# Patient Record
Sex: MALE | Race: WHITE | NOT HISPANIC OR LATINO
[De-identification: names, ages, dates, MRNs, and addresses within clinical notes are randomized per-mention and may not be internally consistent; named-entity substitution may affect disease eponyms.]

---

## 2019-03-03 DIAGNOSIS — H61.23 IMPACTED CERUMEN, BILATERAL: ICD-10-CM

## 2019-03-03 PROBLEM — Z00.00 ENCOUNTER FOR PREVENTIVE HEALTH EXAMINATION: Status: ACTIVE | Noted: 2019-03-03

## 2019-03-03 RX ORDER — ASPIRIN ENTERIC COATED TABLETS 81 MG 81 MG/1
81 TABLET, DELAYED RELEASE ORAL
Refills: 0 | Status: ACTIVE | COMMUNITY

## 2019-06-02 ENCOUNTER — APPOINTMENT (OUTPATIENT)
Dept: INTERNAL MEDICINE | Facility: CLINIC | Age: 79
End: 2019-06-02
Payer: MEDICARE

## 2019-06-02 ENCOUNTER — NON-APPOINTMENT (OUTPATIENT)
Age: 79
End: 2019-06-02

## 2019-06-02 VITALS
HEIGHT: 69 IN | RESPIRATION RATE: 16 BRPM | SYSTOLIC BLOOD PRESSURE: 130 MMHG | BODY MASS INDEX: 30.51 KG/M2 | DIASTOLIC BLOOD PRESSURE: 70 MMHG | WEIGHT: 206 LBS | HEART RATE: 65 BPM

## 2019-06-02 VITALS — SYSTOLIC BLOOD PRESSURE: 132 MMHG | DIASTOLIC BLOOD PRESSURE: 70 MMHG

## 2019-06-02 DIAGNOSIS — Z23 ENCOUNTER FOR IMMUNIZATION: ICD-10-CM

## 2019-06-02 DIAGNOSIS — Z78.9 OTHER SPECIFIED HEALTH STATUS: ICD-10-CM

## 2019-06-02 DIAGNOSIS — Z82.49 FAMILY HISTORY OF ISCHEMIC HEART DISEASE AND OTHER DISEASES OF THE CIRCULATORY SYSTEM: ICD-10-CM

## 2019-06-02 DIAGNOSIS — Z82.3 FAMILY HISTORY OF STROKE: ICD-10-CM

## 2019-06-02 DIAGNOSIS — Z83.3 FAMILY HISTORY OF DIABETES MELLITUS: ICD-10-CM

## 2019-06-02 PROCEDURE — 93000 ELECTROCARDIOGRAM COMPLETE: CPT

## 2019-06-02 PROCEDURE — 90670 PCV13 VACCINE IM: CPT

## 2019-06-02 PROCEDURE — 99204 OFFICE O/P NEW MOD 45 MIN: CPT | Mod: 25

## 2019-06-02 PROCEDURE — G0009: CPT

## 2019-06-02 RX ORDER — METOPROLOL SUCCINATE 25 MG/1
25 TABLET, EXTENDED RELEASE ORAL
Refills: 0 | Status: DISCONTINUED | COMMUNITY
End: 2019-06-02

## 2019-06-02 RX ORDER — CHLORHEXIDINE GLUCONATE 4 %
1000 LIQUID (ML) TOPICAL
Qty: 30 | Refills: 5 | Status: ACTIVE | COMMUNITY
Start: 2019-06-02

## 2019-06-02 NOTE — COUNSELING
[Weight management counseling provided] : Weight management [Activity counseling provided] : activity [Low Fat Diet] : Low fat diet [Healthy eating counseling provided] : healthy eating [Decrease Portions] : Decrease food portions [Walking] : Walking [Low Salt Diet] : Low salt diet [Limited decision making ability] : Limited decision making ability [None] : None

## 2019-06-02 NOTE — HEALTH RISK ASSESSMENT
[(PHQ-2) Unable to screen] : PHQ-2: unable to screen [Patient declined colonoscopy] : Patient declined colonoscopy [Retired] : retired [With Significant Other] : lives with significant other [None] : None [] :  [Independent] : walking [Some assistance needed] : dressing [Full assistance needed] : managing finances [Smoke Detector] : smoke detector [Carbon Monoxide Detector] : carbon monoxide detector [Seat Belt] :  uses seat belt [Sunscreen] : uses sunscreen [] : No [FreeTextEntry1] : reported mood swings.  has set routine - gets set off by variations in the routine [UnableToScreenReason] : dementia [Reports changes in hearing] : Reports no changes in hearing [Reports changes in dental health] : Reports no changes in dental health [Reports changes in vision] : Reports no changes in vision [de-identified] : dementia as noted [de-identified] : dementia

## 2019-06-02 NOTE — PHYSICAL EXAM
[Well Nourished] : well nourished [No Acute Distress] : no acute distress [Well-Appearing] : well-appearing [Normal Sclera/Conjunctiva] : normal sclera/conjunctiva [Well Developed] : well developed [PERRL] : pupils equal round and reactive to light [Normal Oropharynx] : the oropharynx was normal [Normal Outer Ear/Nose] : the outer ears and nose were normal in appearance [EOMI] : extraocular movements intact [Normal Nasal Mucosa] : the nasal mucosa was normal [Normal TMs] : both tympanic membranes were normal [No JVD] : no jugular venous distention [Supple] : supple [No Lymphadenopathy] : no lymphadenopathy [Thyroid Normal, No Nodules] : the thyroid was normal and there were no nodules present [No Respiratory Distress] : no respiratory distress  [Clear to Auscultation] : lungs were clear to auscultation bilaterally [Normal Rate] : normal rate  [No Accessory Muscle Use] : no accessory muscle use [Regular Rhythm] : with a regular rhythm [Normal S1, S2] : normal S1 and S2 [No Carotid Bruits] : no carotid bruits [No Murmur] : no murmur heard [No Abdominal Bruit] : a ~M bruit was not heard ~T in the abdomen [No Edema] : there was no peripheral edema [Pedal Pulses Present] : the pedal pulses are present [Soft] : abdomen soft [No Palpable Aorta] : no palpable aorta [Non-distended] : non-distended [Non Tender] : non-tender [No Masses] : no abdominal mass palpated [No HSM] : no HSM [Normal Bowel Sounds] : normal bowel sounds [Normal Posterior Cervical Nodes] : no posterior cervical lymphadenopathy [No CVA Tenderness] : no CVA  tenderness [Normal Anterior Cervical Nodes] : no anterior cervical lymphadenopathy [No Spinal Tenderness] : no spinal tenderness [No Joint Swelling] : no joint swelling [Grossly Normal Strength/Tone] : grossly normal strength/tone [No Rash] : no rash [No Focal Deficits] : no focal deficits [Coordination Grossly Intact] : coordination grossly intact [Normal Gait] : normal gait [Speech Grossly Normal] : speech grossly normal [de-identified] : interacting, following commands, some confusion.

## 2019-06-02 NOTE — REVIEW OF SYSTEMS
[Confusion] : confusion [Memory Loss] : memory loss [Negative] : Heme/Lymph [Dizziness] : no dizziness [Headache] : no headache [Fainting] : no fainting [Unsteady Walk] : no ataxia

## 2019-06-02 NOTE — HISTORY OF PRESENT ILLNESS
[Spouse] : spouse [FreeTextEntry1] : HTN, HLD, predm, dementia, bph, gait abnormality, b12 def, melanoma s/p excisiions, hx tia [de-identified] : 77 y/o male with a hx of HTN, HLD, predm, dementia, bph, gait abnormality, b12 def, melanoma s/p excisiions, hx tia here to establish care.\par wife cares for him.\par Had labs sent 2 weeks ago.\par \par Has been doing well.  takes meds.  walks and swims for exercise.  diet - healthy.  former smoker, no etoh.\par no c/o.\par \par vaccines - tet '13\par had zostavax\par gets flu vaccine  -? never pneumonia vaccine\par \par derm - follows regularly.\par colon ca screening - defers due to the pt's med issues\par \par \par \par

## 2019-11-03 ENCOUNTER — APPOINTMENT (OUTPATIENT)
Dept: INTERNAL MEDICINE | Facility: CLINIC | Age: 79
End: 2019-11-03
Payer: MEDICARE

## 2019-11-03 VITALS
HEART RATE: 62 BPM | HEIGHT: 69 IN | WEIGHT: 210 LBS | DIASTOLIC BLOOD PRESSURE: 70 MMHG | BODY MASS INDEX: 31.1 KG/M2 | RESPIRATION RATE: 16 BRPM | SYSTOLIC BLOOD PRESSURE: 130 MMHG

## 2019-11-03 PROCEDURE — 99214 OFFICE O/P EST MOD 30 MIN: CPT | Mod: 25

## 2019-11-03 PROCEDURE — G0008: CPT

## 2019-11-03 PROCEDURE — 90662 IIV NO PRSV INCREASED AG IM: CPT

## 2019-11-03 PROCEDURE — 36415 COLL VENOUS BLD VENIPUNCTURE: CPT

## 2019-11-03 NOTE — HISTORY OF PRESENT ILLNESS
[FreeTextEntry1] : HTN, HLD, predm, dementia, bph, gait abnormality, b12 def, melanoma s/p excisiions, hx tia [de-identified] : 79 y/o male with a hx of HTN, HLD, predm, dementia, bph, gait abnormality, b12 def, melanoma s/p excisiions, hx tia here to establish care.\par wife cares for him.\par Has been doing well. takes meds. walks and swims for exercise. Swimming on hiatus for the season\par diet - healthy. former smoker, no etoh.\par no c/o.\par Has been doing well.  \par Dementia stable.  \par \par vaccines - tet '13\par had zostavax\par gets flu vaccine -\par \par derm - follows regularly.\par colon ca screening - defers due to the pt's med issues

## 2019-11-03 NOTE — PHYSICAL EXAM
[No Acute Distress] : no acute distress [Well Nourished] : well nourished [Well Developed] : well developed [Well-Appearing] : well-appearing [Normal Sclera/Conjunctiva] : normal sclera/conjunctiva [PERRL] : pupils equal round and reactive to light [EOMI] : extraocular movements intact [Normal Outer Ear/Nose] : the outer ears and nose were normal in appearance [Normal Oropharynx] : the oropharynx was normal [No JVD] : no jugular venous distention [No Lymphadenopathy] : no lymphadenopathy [Supple] : supple [Thyroid Normal, No Nodules] : the thyroid was normal and there were no nodules present [No Respiratory Distress] : no respiratory distress  [No Accessory Muscle Use] : no accessory muscle use [Clear to Auscultation] : lungs were clear to auscultation bilaterally [Normal Rate] : normal rate  [Regular Rhythm] : with a regular rhythm [Normal S1, S2] : normal S1 and S2 [No Murmur] : no murmur heard [No Carotid Bruits] : no carotid bruits [No Abdominal Bruit] : a ~M bruit was not heard ~T in the abdomen [Pedal Pulses Present] : the pedal pulses are present [No Edema] : there was no peripheral edema [Soft] : abdomen soft [Non Tender] : non-tender [Non-distended] : non-distended [No Masses] : no abdominal mass palpated [No HSM] : no HSM [Normal Bowel Sounds] : normal bowel sounds [Normal Posterior Cervical Nodes] : no posterior cervical lymphadenopathy [Normal Anterior Cervical Nodes] : no anterior cervical lymphadenopathy [No CVA Tenderness] : no CVA  tenderness [No Spinal Tenderness] : no spinal tenderness [No Joint Swelling] : no joint swelling [Grossly Normal Strength/Tone] : grossly normal strength/tone [No Rash] : no rash [Coordination Grossly Intact] : coordination grossly intact [No Focal Deficits] : no focal deficits [Normal Gait] : normal gait [Speech Grossly Normal] : speech grossly normal [de-identified] : dec hearing [de-identified] : answering questions, following commands, calm, pleasant.

## 2019-11-03 NOTE — REVIEW OF SYSTEMS
[Confusion] : confusion [Memory Loss] : memory loss [Negative] : Heme/Lymph [Headache] : no headache [Dizziness] : no dizziness [Fainting] : no fainting [Unsteady Walk] : no ataxia

## 2019-11-03 NOTE — HEALTH RISK ASSESSMENT
[No] : In the past 12 months have you used drugs other than those required for medical reasons? No [(PHQ-2) Unable to screen] : PHQ-2: unable to screen [] : No [Audit-CScore] : 0 [UnableToScreenReason] : dementia

## 2019-11-03 NOTE — COUNSELING
[Encouraged to increase physical activity] : Encouraged to increase physical activity [Decrease Portions] : decrease portions

## 2019-11-04 LAB
ALBUMIN SERPL ELPH-MCNC: 4.8 G/DL
ALP BLD-CCNC: 82 U/L
ALT SERPL-CCNC: 37 U/L
ANION GAP SERPL CALC-SCNC: 18 MMOL/L
AST SERPL-CCNC: 27 U/L
BASOPHILS # BLD AUTO: 0.02 K/UL
BASOPHILS NFR BLD AUTO: 0.4 %
BILIRUB SERPL-MCNC: 0.8 MG/DL
BUN SERPL-MCNC: 12 MG/DL
CALCIUM SERPL-MCNC: 9.7 MG/DL
CHLORIDE SERPL-SCNC: 106 MMOL/L
CHOLEST SERPL-MCNC: 148 MG/DL
CHOLEST/HDLC SERPL: 3.4 RATIO
CO2 SERPL-SCNC: 23 MMOL/L
CREAT SERPL-MCNC: 1.08 MG/DL
EOSINOPHIL # BLD AUTO: 0.19 K/UL
EOSINOPHIL NFR BLD AUTO: 3.4 %
ESTIMATED AVERAGE GLUCOSE: 131 MG/DL
FOLATE SERPL-MCNC: >20 NG/ML
GLUCOSE SERPL-MCNC: 124 MG/DL
HBA1C MFR BLD HPLC: 6.2 %
HCT VFR BLD CALC: 47.8 %
HDLC SERPL-MCNC: 43 MG/DL
HGB BLD-MCNC: 15.3 G/DL
IMM GRANULOCYTES NFR BLD AUTO: 0.7 %
LDLC SERPL CALC-MCNC: 89 MG/DL
LYMPHOCYTES # BLD AUTO: 2.24 K/UL
LYMPHOCYTES NFR BLD AUTO: 40.1 %
MAN DIFF?: NORMAL
MCHC RBC-ENTMCNC: 29.9 PG
MCHC RBC-ENTMCNC: 32 GM/DL
MCV RBC AUTO: 93.5 FL
MONOCYTES # BLD AUTO: 0.71 K/UL
MONOCYTES NFR BLD AUTO: 12.7 %
NEUTROPHILS # BLD AUTO: 2.39 K/UL
NEUTROPHILS NFR BLD AUTO: 42.7 %
PLATELET # BLD AUTO: 246 K/UL
POTASSIUM SERPL-SCNC: 4.9 MMOL/L
PROT SERPL-MCNC: 7.4 G/DL
RBC # BLD: 5.11 M/UL
RBC # FLD: 12.4 %
SODIUM SERPL-SCNC: 147 MMOL/L
TRIGL SERPL-MCNC: 81 MG/DL
TSH SERPL-ACNC: 1.15 UIU/ML
VIT B12 SERPL-MCNC: 763 PG/ML
WBC # FLD AUTO: 5.59 K/UL

## 2020-04-19 ENCOUNTER — APPOINTMENT (OUTPATIENT)
Dept: INTERNAL MEDICINE | Facility: CLINIC | Age: 80
End: 2020-04-19

## 2020-06-16 ENCOUNTER — APPOINTMENT (OUTPATIENT)
Dept: INTERNAL MEDICINE | Facility: CLINIC | Age: 80
End: 2020-06-16
Payer: MEDICARE

## 2020-06-16 PROCEDURE — 99214 OFFICE O/P EST MOD 30 MIN: CPT | Mod: 95

## 2020-06-16 NOTE — PHYSICAL EXAM
[No Acute Distress] : no acute distress [Well Nourished] : well nourished [Well Developed] : well developed [Well-Appearing] : well-appearing [Normal Voice/Communication] : normal voice/communication [No Respiratory Distress] : no respiratory distress  [Coordination Grossly Intact] : coordination grossly intact [Normal Mood] : the mood was normal [Speech Grossly Normal] : speech grossly normal [de-identified] : confusion, interacting w/o issues. [de-identified] : dementia

## 2020-06-16 NOTE — HISTORY OF PRESENT ILLNESS
[Spouse] : spouse [Home] : at home, [unfilled] , at the time of the visit. [Medical Office: ([facility])___] : at the medical office located in  [FreeTextEntry1] : HTN, HLD, predm, dementia, bph, gait abnormality, b12 def, melanoma s/p excisiions, hx tia [FreeTextEntry3] : Wife [de-identified] : 79 y/o male with a hx of HTN, HLD, predm, dementia, bph, gait abnormality, b12 def, melanoma s/p excisiions, hx tia for f/u.\par wife cares for him.\par Has been doing well. takes meds. walks and swims for exercise. Swimming on hiatus for the season and b/c covid 19\par diet - healthy. former smoker, no etoh.\par no c/o.\par With pain/arthritis at the hand.  Occ pain at the back of the legs with walking.  \par Dementia has been worsened - some wandering and intermittent inc confusion.  Has had 1-2 incidences of wandering - wife asking for something to help calm the pt.  \par \par vaccines - tet '13\par had zostavax\par gets flu vaccine -\par \par derm - follows regularly.\par colon ca screening - defers due to the pt's med issues

## 2020-06-16 NOTE — REVIEW OF SYSTEMS
[Joint Pain] : joint pain [Confusion] : confusion [Memory Loss] : memory loss [Negative] : Integumentary [Joint Stiffness] : no joint stiffness [Muscle Pain] : no muscle pain [Back Pain] : no back pain [Joint Swelling] : no joint swelling [Muscle Weakness] : no muscle weakness [Headache] : no headache [Dizziness] : no dizziness [Fainting] : no fainting [Unsteady Walk] : no ataxia

## 2020-06-16 NOTE — HEALTH RISK ASSESSMENT
[No] : No [(PHQ-2) Unable to screen] : PHQ-2: unable to screen [] : No [Audit-CScore] : 0 [UnableToScreenReason] : marc

## 2020-06-28 LAB
ALBUMIN SERPL ELPH-MCNC: 4.5 G/DL
ALP BLD-CCNC: 72 U/L
ALT SERPL-CCNC: 34 U/L
ANION GAP SERPL CALC-SCNC: 23 MMOL/L
AST SERPL-CCNC: 23 U/L
BASOPHILS # BLD AUTO: 0.03 K/UL
BASOPHILS NFR BLD AUTO: 0.5 %
BILIRUB SERPL-MCNC: 0.7 MG/DL
BUN SERPL-MCNC: 15 MG/DL
CALCIUM SERPL-MCNC: 9.1 MG/DL
CHLORIDE SERPL-SCNC: 96 MMOL/L
CHOLEST SERPL-MCNC: 133 MG/DL
CHOLEST/HDLC SERPL: 3.6 RATIO
CO2 SERPL-SCNC: 22 MMOL/L
CREAT SERPL-MCNC: 1.06 MG/DL
EOSINOPHIL # BLD AUTO: 0.09 K/UL
EOSINOPHIL NFR BLD AUTO: 1.6 %
ESTIMATED AVERAGE GLUCOSE: 140 MG/DL
FOLATE SERPL-MCNC: >20 NG/ML
GLUCOSE SERPL-MCNC: 139 MG/DL
HBA1C MFR BLD HPLC: 6.5 %
HCT VFR BLD CALC: 47.1 %
HDLC SERPL-MCNC: 37 MG/DL
HGB BLD-MCNC: 14.7 G/DL
IMM GRANULOCYTES NFR BLD AUTO: 0.7 %
LDLC SERPL CALC-MCNC: 69 MG/DL
LYMPHOCYTES # BLD AUTO: 2.08 K/UL
LYMPHOCYTES NFR BLD AUTO: 36.9 %
MAN DIFF?: NORMAL
MCHC RBC-ENTMCNC: 29.6 PG
MCHC RBC-ENTMCNC: 31.2 GM/DL
MCV RBC AUTO: 95 FL
MONOCYTES # BLD AUTO: 0.62 K/UL
MONOCYTES NFR BLD AUTO: 11 %
NEUTROPHILS # BLD AUTO: 2.78 K/UL
NEUTROPHILS NFR BLD AUTO: 49.3 %
PLATELET # BLD AUTO: 212 K/UL
POTASSIUM SERPL-SCNC: 3.5 MMOL/L
PROT SERPL-MCNC: 6.8 G/DL
RBC # BLD: 4.96 M/UL
RBC # FLD: 12.7 %
SODIUM SERPL-SCNC: 141 MMOL/L
TRIGL SERPL-MCNC: 138 MG/DL
VIT B12 SERPL-MCNC: 727 PG/ML
WBC # FLD AUTO: 5.64 K/UL

## 2020-09-06 ENCOUNTER — APPOINTMENT (OUTPATIENT)
Dept: INTERNAL MEDICINE | Facility: CLINIC | Age: 80
End: 2020-09-06
Payer: MEDICARE

## 2020-09-06 VITALS
HEIGHT: 69 IN | BODY MASS INDEX: 32.14 KG/M2 | HEART RATE: 65 BPM | SYSTOLIC BLOOD PRESSURE: 130 MMHG | RESPIRATION RATE: 16 BRPM | DIASTOLIC BLOOD PRESSURE: 78 MMHG | WEIGHT: 217 LBS

## 2020-09-06 PROCEDURE — G0008: CPT

## 2020-09-06 PROCEDURE — 90662 IIV NO PRSV INCREASED AG IM: CPT

## 2020-09-06 PROCEDURE — 99214 OFFICE O/P EST MOD 30 MIN: CPT | Mod: 25

## 2020-09-06 NOTE — REVIEW OF SYSTEMS
[Joint Pain] : joint pain [Confusion] : confusion [Memory Loss] : memory loss [Negative] : Genitourinary [Muscle Weakness] : no muscle weakness [Muscle Pain] : no muscle pain [Joint Stiffness] : no joint stiffness [Back Pain] : no back pain [Headache] : no headache [Joint Swelling] : no joint swelling [Fainting] : no fainting [Unsteady Walk] : no ataxia [Dizziness] : no dizziness

## 2020-09-06 NOTE — HEALTH RISK ASSESSMENT
[No] : In the past 12 months have you used drugs other than those required for medical reasons? No [(PHQ-2) Unable to screen] : PHQ-2: unable to screen [] : No [UnableToScreenReason] : dementia [Audit-CScore] : 0

## 2020-09-06 NOTE — PHYSICAL EXAM
[No Acute Distress] : no acute distress [Well-Appearing] : well-appearing [Well Nourished] : well nourished [Well Developed] : well developed [Coordination Grossly Intact] : coordination grossly intact [Normal Voice/Communication] : normal voice/communication [No Respiratory Distress] : no respiratory distress  [Speech Grossly Normal] : speech grossly normal [Normal Mood] : the mood was normal [de-identified] : dementia [de-identified] : confusion, interacting w/o issues.

## 2020-09-06 NOTE — COUNSELING
[Decrease Portions] : decrease portions [Encouraged to increase physical activity] : Encouraged to increase physical activity [Limited decision making ability] : Limited decision making ability [Needs reinforcement, provided] : Patient needs reinforcement on understanding of lifestyle changes and steps needed to achieve self management goal; reinforcement was provided

## 2020-09-06 NOTE — HISTORY OF PRESENT ILLNESS
[Spouse] : spouse [FreeTextEntry1] : HTN, HLD, predm, dementia, bph, gait abnormality, b12 def, melanoma s/p excisiions, hx tia [de-identified] : 80 y/o male with a hx of HTN, HLD, predm, dementia, bph, gait abnormality, b12 def, melanoma s/p excisiions, hx tia for f/u.\par wife cares for him.\par Has been doing well. takes meds. walks and swims for exercise. Swimming on hiatus for the season and b/c covid 19\par diet - healthy. former smoker, no etoh.\par no c/o.\par With pain/arthritis at the hand - has been getting worse - with lt 4th finger trigger finger.  Occ pain at the back of the legs with walking.  \par Dementia - has been getting good relief of the anx with the citalopram.  wears off a bit in the evening and occ has night time sx.  Asking about inc the dose.\par The tamsulosin has been less effective.  with inc in the nocturia.   \par \par vaccines - tet '13\par had zostavax\par gets flu vaccine -\par \par derm - follows regularly.\par colon ca screening - defers due to the pt's med issues

## 2020-11-16 ENCOUNTER — RX RENEWAL (OUTPATIENT)
Age: 80
End: 2020-11-16

## 2020-11-23 ENCOUNTER — RX RENEWAL (OUTPATIENT)
Age: 80
End: 2020-11-23

## 2020-11-29 ENCOUNTER — APPOINTMENT (OUTPATIENT)
Dept: INTERNAL MEDICINE | Facility: CLINIC | Age: 80
End: 2020-11-29
Payer: MEDICARE

## 2020-11-29 VITALS
WEIGHT: 218 LBS | HEIGHT: 69 IN | HEART RATE: 67 BPM | RESPIRATION RATE: 16 BRPM | SYSTOLIC BLOOD PRESSURE: 128 MMHG | BODY MASS INDEX: 32.29 KG/M2 | DIASTOLIC BLOOD PRESSURE: 80 MMHG

## 2020-11-29 PROCEDURE — 99214 OFFICE O/P EST MOD 30 MIN: CPT | Mod: 25

## 2020-11-29 PROCEDURE — 36415 COLL VENOUS BLD VENIPUNCTURE: CPT

## 2020-11-29 RX ORDER — CITALOPRAM HYDROBROMIDE 10 MG/1
10 TABLET, FILM COATED ORAL
Qty: 30 | Refills: 0 | Status: COMPLETED | COMMUNITY
Start: 2020-06-16

## 2020-11-29 RX ORDER — CLOBETASOL PROPIONATE 0.5 MG/G
0.05 CREAM TOPICAL
Qty: 60 | Refills: 0 | Status: COMPLETED | COMMUNITY
Start: 2020-07-02

## 2020-11-29 NOTE — COUNSELING
[Encouraged to increase physical activity] : Encouraged to increase physical activity [Limited decision making ability] : Limited decision making ability [Needs reinforcement, provided] : Patient needs reinforcement on understanding of lifestyle changes and steps needed to achieve self management goal; reinforcement was provided

## 2020-11-29 NOTE — HISTORY OF PRESENT ILLNESS
[Spouse] : spouse [FreeTextEntry1] : HTN, HLD, predm, dementia, bph, gait abnormality, b12 def, melanoma s/p excisiions, hx tia [de-identified] : 80 y/o male with a hx of HTN, HLD, predm, dementia, bph, gait abnormality, b12 def, melanoma s/p excisiions, hx tia for f/u.\par wife cares for him.\par Has been doing well. takes meds. walks and swims for exercise. Swimming on hiatus for the season and b/c covid 19 - has been sl worse.\par diet - healthy. former smoker, no etoh.\par no c/o.\par With pain/arthritis at the hand - has been getting worse - with lt 4th finger trigger finger.  Occ pain at the back of the legs with walking - has nor been able to go for eval.  Stable.  \par Dementia - has been getting good relief of the anx with the citalopram.  wears off a bit in the evening and occ has night time sx.  Has been declining a bit.  \par The tamsulosin has been less effective.  with inc in the nocturia - no change.   \par \par vaccines - tet '13\par had zostavax\par gets flu vaccine -\par \par derm - follows regularly.\par colon ca screening - defers due to the pt's med issues

## 2020-11-29 NOTE — REVIEW OF SYSTEMS
[Joint Pain] : joint pain [Confusion] : confusion [Memory Loss] : memory loss [Negative] : Heme/Lymph [Joint Stiffness] : no joint stiffness [Muscle Pain] : no muscle pain [Muscle Weakness] : no muscle weakness [Back Pain] : no back pain [Joint Swelling] : no joint swelling [Headache] : no headache [Dizziness] : no dizziness [Fainting] : no fainting [Unsteady Walk] : no ataxia

## 2020-11-29 NOTE — PHYSICAL EXAM
[No Acute Distress] : no acute distress [Well Nourished] : well nourished [Well Developed] : well developed [Well-Appearing] : well-appearing [Normal Voice/Communication] : normal voice/communication [No Respiratory Distress] : no respiratory distress  [Coordination Grossly Intact] : coordination grossly intact [Speech Grossly Normal] : speech grossly normal [Normal Sclera/Conjunctiva] : normal sclera/conjunctiva [PERRL] : pupils equal round and reactive to light [EOMI] : extraocular movements intact [Normal Outer Ear/Nose] : the outer ears and nose were normal in appearance [No JVD] : no jugular venous distention [No Lymphadenopathy] : no lymphadenopathy [Supple] : supple [No Accessory Muscle Use] : no accessory muscle use [Clear to Auscultation] : lungs were clear to auscultation bilaterally [Normal Rate] : normal rate  [Regular Rhythm] : with a regular rhythm [Normal S1, S2] : normal S1 and S2 [No Murmur] : no murmur heard [No Carotid Bruits] : no carotid bruits [No Abdominal Bruit] : a ~M bruit was not heard ~T in the abdomen [Pedal Pulses Present] : the pedal pulses are present [No Edema] : there was no peripheral edema [Soft] : abdomen soft [Non Tender] : non-tender [Non-distended] : non-distended [No Masses] : no abdominal mass palpated [No HSM] : no HSM [Normal Bowel Sounds] : normal bowel sounds [Normal Posterior Cervical Nodes] : no posterior cervical lymphadenopathy [Normal Anterior Cervical Nodes] : no anterior cervical lymphadenopathy [No CVA Tenderness] : no CVA  tenderness [No Spinal Tenderness] : no spinal tenderness [No Joint Swelling] : no joint swelling [Grossly Normal Strength/Tone] : grossly normal strength/tone [de-identified] : confusion, some interaction [de-identified] : dementia

## 2020-12-01 LAB
ALBUMIN SERPL ELPH-MCNC: 4.7 G/DL
ALP BLD-CCNC: 79 U/L
ALT SERPL-CCNC: 41 U/L
ANION GAP SERPL CALC-SCNC: 12 MMOL/L
AST SERPL-CCNC: 23 U/L
BASOPHILS # BLD AUTO: 0.03 K/UL
BASOPHILS NFR BLD AUTO: 0.5 %
BILIRUB SERPL-MCNC: 0.7 MG/DL
BUN SERPL-MCNC: 15 MG/DL
CALCIUM SERPL-MCNC: 9.4 MG/DL
CHLORIDE SERPL-SCNC: 103 MMOL/L
CHOLEST SERPL-MCNC: 139 MG/DL
CO2 SERPL-SCNC: 27 MMOL/L
CREAT SERPL-MCNC: 1.05 MG/DL
EOSINOPHIL # BLD AUTO: 0.16 K/UL
EOSINOPHIL NFR BLD AUTO: 2.7 %
ESTIMATED AVERAGE GLUCOSE: 143 MG/DL
FOLATE SERPL-MCNC: >20 NG/ML
GLUCOSE SERPL-MCNC: 147 MG/DL
HBA1C MFR BLD HPLC: 6.6 %
HCT VFR BLD CALC: 47.3 %
HDLC SERPL-MCNC: 40 MG/DL
HGB BLD-MCNC: 15 G/DL
IMM GRANULOCYTES NFR BLD AUTO: 0.8 %
LDLC SERPL CALC-MCNC: 75 MG/DL
LYMPHOCYTES # BLD AUTO: 2.27 K/UL
LYMPHOCYTES NFR BLD AUTO: 38.2 %
MAN DIFF?: NORMAL
MCHC RBC-ENTMCNC: 29.9 PG
MCHC RBC-ENTMCNC: 31.7 GM/DL
MCV RBC AUTO: 94.4 FL
MONOCYTES # BLD AUTO: 0.6 K/UL
MONOCYTES NFR BLD AUTO: 10.1 %
NEUTROPHILS # BLD AUTO: 2.84 K/UL
NEUTROPHILS NFR BLD AUTO: 47.7 %
NONHDLC SERPL-MCNC: 99 MG/DL
PLATELET # BLD AUTO: 211 K/UL
POTASSIUM SERPL-SCNC: 5.1 MMOL/L
PROT SERPL-MCNC: 7.1 G/DL
RBC # BLD: 5.01 M/UL
RBC # FLD: 12.5 %
SODIUM SERPL-SCNC: 142 MMOL/L
TRIGL SERPL-MCNC: 121 MG/DL
TSH SERPL-ACNC: 1.24 UIU/ML
VIT B12 SERPL-MCNC: 778 PG/ML
WBC # FLD AUTO: 5.95 K/UL

## 2021-03-07 ENCOUNTER — RX RENEWAL (OUTPATIENT)
Age: 81
End: 2021-03-07

## 2021-03-21 ENCOUNTER — APPOINTMENT (OUTPATIENT)
Dept: INTERNAL MEDICINE | Facility: CLINIC | Age: 81
End: 2021-03-21
Payer: MEDICARE

## 2021-03-21 VITALS
HEART RATE: 64 BPM | RESPIRATION RATE: 16 BRPM | HEIGHT: 69 IN | WEIGHT: 220 LBS | OXYGEN SATURATION: 97 % | BODY MASS INDEX: 32.58 KG/M2 | SYSTOLIC BLOOD PRESSURE: 120 MMHG | TEMPERATURE: 97.8 F | DIASTOLIC BLOOD PRESSURE: 80 MMHG

## 2021-03-21 PROCEDURE — 36415 COLL VENOUS BLD VENIPUNCTURE: CPT

## 2021-03-21 PROCEDURE — 99214 OFFICE O/P EST MOD 30 MIN: CPT | Mod: 25

## 2021-03-21 RX ORDER — MEMANTINE HYDROCHLORIDE 10 MG/1
10 TABLET, FILM COATED ORAL
Qty: 180 | Refills: 3 | Status: ACTIVE | COMMUNITY
Start: 2020-11-23 | End: 1900-01-01

## 2021-03-21 RX ORDER — ATORVASTATIN CALCIUM 10 MG/1
10 TABLET, FILM COATED ORAL
Qty: 90 | Refills: 3 | Status: ACTIVE | COMMUNITY
Start: 1900-01-01 | End: 1900-01-01

## 2021-03-21 RX ORDER — CITALOPRAM HYDROBROMIDE 20 MG/1
20 TABLET, FILM COATED ORAL
Qty: 90 | Refills: 3 | Status: ACTIVE | COMMUNITY
Start: 2020-06-16 | End: 1900-01-01

## 2021-03-21 RX ORDER — RIVASTIGMINE TARTRATE 4.5 MG/1
4.5 CAPSULE ORAL
Qty: 90 | Refills: 3 | Status: ACTIVE | COMMUNITY
Start: 2020-11-16 | End: 1900-01-01

## 2021-03-21 RX ORDER — METOPROLOL TARTRATE 25 MG/1
25 TABLET, FILM COATED ORAL
Qty: 90 | Refills: 3 | Status: ACTIVE | COMMUNITY
Start: 2019-06-02 | End: 1900-01-01

## 2021-03-21 RX ORDER — TAMSULOSIN HYDROCHLORIDE 0.4 MG/1
0.4 CAPSULE ORAL
Qty: 90 | Refills: 3 | Status: ACTIVE | COMMUNITY
Start: 2021-03-07 | End: 1900-01-01

## 2021-03-21 NOTE — COUNSELING
[Potential consequences of obesity discussed] : Potential consequences of obesity discussed [Benefits of weight loss discussed] : Benefits of weight loss discussed [Encouraged to increase physical activity] : Encouraged to increase physical activity [Decrease Portions] : decrease portions [Limited decision making ability] : Limited decision making ability [Needs reinforcement, provided] : Patient needs reinforcement on understanding of lifestyle changes and steps needed to achieve self management goal; reinforcement was provided [Fall prevention counseling provided] : Fall prevention counseling provided [Adequate lighting] : Adequate lighting [No throw rugs] : No throw rugs [Use proper foot wear] : Use proper foot wear [Use recommended devices] : Use recommended devices

## 2021-03-21 NOTE — HISTORY OF PRESENT ILLNESS
[Spouse] : spouse [FreeTextEntry1] : HTN, HLD, predm, dementia, bph, gait abnormality, b12 def, melanoma s/p excisiions, hx tia [de-identified] : 79 y/o male with a hx of HTN, HLD, predm, dementia, bph, gait abnormality, b12 def, melanoma s/p excisiions, hx tia for f/u.\par wife cares for him.\par Had been doing well. takes meds. walks and swims for exercise. Swimming on hiatus for the season and b/c covid 19 - has been sl worse.\par diet - healthy. former smoker, no etoh.\par no c/o.\par With pain/arthritis at the hand - has been getting worse - with lt 4th finger trigger finger.  Occ pain at the back of the legs with walking - has nor been able to go for eval.  Stable.  \par Dementia - has been getting good relief of the anx with the citalopram.  wears off a bit in the evening and occ has night time sx.  Has been declining with waking during the night and looking for someone.  does not know his wife during these episodes.  Has been having more often.  Gait has been off.  language has been off.  \par The tamsulosin has been less effective.  with inc in the nocturia - no change.   \par \par vaccines - tet '13\par had zostavax\par gets flu vaccine -\par Had covid vaccine #1\par \par derm - follows regularly.\par colon ca screening - defers due to the pt's med issues

## 2021-03-21 NOTE — PHYSICAL EXAM
[Well Nourished] : well nourished [Well Developed] : well developed [Well-Appearing] : well-appearing [Normal Sclera/Conjunctiva] : normal sclera/conjunctiva [PERRL] : pupils equal round and reactive to light [EOMI] : extraocular movements intact [Normal Outer Ear/Nose] : the outer ears and nose were normal in appearance [No JVD] : no jugular venous distention [No Lymphadenopathy] : no lymphadenopathy [Supple] : supple [No Respiratory Distress] : no respiratory distress  [No Accessory Muscle Use] : no accessory muscle use [Clear to Auscultation] : lungs were clear to auscultation bilaterally [Normal Rate] : normal rate  [Regular Rhythm] : with a regular rhythm [Normal S1, S2] : normal S1 and S2 [No Murmur] : no murmur heard [No Carotid Bruits] : no carotid bruits [No Abdominal Bruit] : a ~M bruit was not heard ~T in the abdomen [Pedal Pulses Present] : the pedal pulses are present [No Edema] : there was no peripheral edema [Soft] : abdomen soft [Non Tender] : non-tender [Non-distended] : non-distended [No Masses] : no abdominal mass palpated [No HSM] : no HSM [Normal Bowel Sounds] : normal bowel sounds [Normal Posterior Cervical Nodes] : no posterior cervical lymphadenopathy [Normal Anterior Cervical Nodes] : no anterior cervical lymphadenopathy [No CVA Tenderness] : no CVA  tenderness [No Spinal Tenderness] : no spinal tenderness [No Joint Swelling] : no joint swelling [Grossly Normal Strength/Tone] : grossly normal strength/tone [Coordination Grossly Intact] : coordination grossly intact [Speech Grossly Normal] : speech grossly normal [No Acute Distress] : no acute distress [de-identified] : confusion, some interaction [de-identified] : dementia

## 2021-03-21 NOTE — REVIEW OF SYSTEMS
[Joint Pain] : joint pain [Confusion] : confusion [Memory Loss] : memory loss [Negative] : Heme/Lymph [Unsteady Walk] : ataxia [Joint Stiffness] : no joint stiffness [Muscle Pain] : no muscle pain [Muscle Weakness] : no muscle weakness [Back Pain] : no back pain [Joint Swelling] : no joint swelling [Headache] : no headache [Dizziness] : no dizziness [Fainting] : no fainting

## 2021-03-21 NOTE — ASSESSMENT
[FreeTextEntry1] : offered home care eval or poss care coordination eval.  Declined by the pt's wife.

## 2021-03-22 LAB
ALBUMIN SERPL ELPH-MCNC: 4.4 G/DL
ALP BLD-CCNC: 84 U/L
ALT SERPL-CCNC: 33 U/L
ANION GAP SERPL CALC-SCNC: 14 MMOL/L
AST SERPL-CCNC: 21 U/L
BASOPHILS # BLD AUTO: 0.03 K/UL
BASOPHILS NFR BLD AUTO: 0.6 %
BILIRUB SERPL-MCNC: 0.5 MG/DL
BUN SERPL-MCNC: 15 MG/DL
CALCIUM SERPL-MCNC: 8.9 MG/DL
CHLORIDE SERPL-SCNC: 104 MMOL/L
CHOLEST SERPL-MCNC: 131 MG/DL
CO2 SERPL-SCNC: 22 MMOL/L
CREAT SERPL-MCNC: 1 MG/DL
EOSINOPHIL # BLD AUTO: 0.15 K/UL
EOSINOPHIL NFR BLD AUTO: 2.8 %
ESTIMATED AVERAGE GLUCOSE: 148 MG/DL
FOLATE SERPL-MCNC: >20 NG/ML
GLUCOSE SERPL-MCNC: 150 MG/DL
HBA1C MFR BLD HPLC: 6.8 %
HCT VFR BLD CALC: 45.5 %
HDLC SERPL-MCNC: 41 MG/DL
HGB BLD-MCNC: 14.9 G/DL
IMM GRANULOCYTES NFR BLD AUTO: 0.9 %
LDLC SERPL CALC-MCNC: 74 MG/DL
LYMPHOCYTES # BLD AUTO: 2.2 K/UL
LYMPHOCYTES NFR BLD AUTO: 40.4 %
MAN DIFF?: NORMAL
MCHC RBC-ENTMCNC: 30 PG
MCHC RBC-ENTMCNC: 32.7 GM/DL
MCV RBC AUTO: 91.7 FL
MONOCYTES # BLD AUTO: 0.64 K/UL
MONOCYTES NFR BLD AUTO: 11.8 %
NEUTROPHILS # BLD AUTO: 2.37 K/UL
NEUTROPHILS NFR BLD AUTO: 43.5 %
NONHDLC SERPL-MCNC: 91 MG/DL
PLATELET # BLD AUTO: 214 K/UL
POTASSIUM SERPL-SCNC: 4.5 MMOL/L
PROT SERPL-MCNC: 7 G/DL
RBC # BLD: 4.96 M/UL
RBC # FLD: 12.6 %
SODIUM SERPL-SCNC: 140 MMOL/L
TRIGL SERPL-MCNC: 85 MG/DL
TSH SERPL-ACNC: 1.34 UIU/ML
VIT B12 SERPL-MCNC: 741 PG/ML
WBC # FLD AUTO: 5.44 K/UL

## 2021-07-02 ENCOUNTER — RX RENEWAL (OUTPATIENT)
Age: 81
End: 2021-07-02

## 2021-08-31 ENCOUNTER — NON-APPOINTMENT (OUTPATIENT)
Age: 81
End: 2021-08-31

## 2021-08-31 ENCOUNTER — EMERGENCY (EMERGENCY)
Facility: HOSPITAL | Age: 81
LOS: 0 days | Discharge: HOME | End: 2021-08-31
Attending: STUDENT IN AN ORGANIZED HEALTH CARE EDUCATION/TRAINING PROGRAM
Payer: MEDICARE

## 2021-08-31 VITALS
SYSTOLIC BLOOD PRESSURE: 149 MMHG | HEART RATE: 84 BPM | TEMPERATURE: 98 F | DIASTOLIC BLOOD PRESSURE: 89 MMHG | OXYGEN SATURATION: 97 % | RESPIRATION RATE: 19 BRPM

## 2021-08-31 VITALS
SYSTOLIC BLOOD PRESSURE: 125 MMHG | HEART RATE: 63 BPM | RESPIRATION RATE: 20 BRPM | TEMPERATURE: 98 F | OXYGEN SATURATION: 100 % | HEIGHT: 69 IN | DIASTOLIC BLOOD PRESSURE: 75 MMHG | WEIGHT: 190.04 LBS

## 2021-08-31 DIAGNOSIS — F02.80 DEMENTIA IN OTHER DISEASES CLASSIFIED ELSEWHERE, UNSPECIFIED SEVERITY, WITHOUT BEHAVIORAL DISTURBANCE, PSYCHOTIC DISTURBANCE, MOOD DISTURBANCE, AND ANXIETY: ICD-10-CM

## 2021-08-31 DIAGNOSIS — S22.41XA MULTIPLE FRACTURES OF RIBS, RIGHT SIDE, INITIAL ENCOUNTER FOR CLOSED FRACTURE: ICD-10-CM
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
## 2021-08-31 DIAGNOSIS — E78.5 HYPERLIPIDEMIA, UNSPECIFIED: ICD-10-CM

## 2021-08-31 DIAGNOSIS — R10.9 UNSPECIFIED ABDOMINAL PAIN: ICD-10-CM
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
## 2021-08-31 DIAGNOSIS — G30.9 ALZHEIMER'S DISEASE, UNSPECIFIED: ICD-10-CM

## 2021-08-31 DIAGNOSIS — N40.0 BENIGN PROSTATIC HYPERPLASIA WITHOUT LOWER URINARY TRACT SYMPTOMS: ICD-10-CM
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
## 2021-08-31 LAB
ALBUMIN SERPL ELPH-MCNC: 3.3 G/DL — SIGNIFICANT CHANGE UP (ref 3.3–5)
ALP SERPL-CCNC: 79 U/L — SIGNIFICANT CHANGE UP (ref 40–120)
ALT FLD-CCNC: 39 U/L — SIGNIFICANT CHANGE UP (ref 12–78)
ANION GAP SERPL CALC-SCNC: 3 MMOL/L — LOW (ref 5–17)
APPEARANCE UR: CLEAR — SIGNIFICANT CHANGE UP
AST SERPL-CCNC: 19 U/L — SIGNIFICANT CHANGE UP (ref 15–37)
BACTERIA # UR AUTO: ABNORMAL
BASOPHILS # BLD AUTO: 0.04 K/UL — SIGNIFICANT CHANGE UP (ref 0–0.2)
BASOPHILS NFR BLD AUTO: 0.7 % — SIGNIFICANT CHANGE UP (ref 0–2)
BILIRUB SERPL-MCNC: 0.5 MG/DL — SIGNIFICANT CHANGE UP (ref 0.2–1.2)
BILIRUB UR-MCNC: NEGATIVE — SIGNIFICANT CHANGE UP
BLD GP AB SCN SERPL QL: SIGNIFICANT CHANGE UP
BUN SERPL-MCNC: 13 MG/DL — SIGNIFICANT CHANGE UP (ref 7–23)
CALCIUM SERPL-MCNC: 8.4 MG/DL — LOW (ref 8.5–10.1)
CHLORIDE SERPL-SCNC: 108 MMOL/L — SIGNIFICANT CHANGE UP (ref 96–108)
CO2 SERPL-SCNC: 28 MMOL/L — SIGNIFICANT CHANGE UP (ref 22–31)
COLOR SPEC: YELLOW — SIGNIFICANT CHANGE UP
CREAT SERPL-MCNC: 1.13 MG/DL — SIGNIFICANT CHANGE UP (ref 0.5–1.3)
DIFF PNL FLD: NEGATIVE — SIGNIFICANT CHANGE UP
EOSINOPHIL # BLD AUTO: 0.27 K/UL — SIGNIFICANT CHANGE UP (ref 0–0.5)
EOSINOPHIL NFR BLD AUTO: 4.4 % — SIGNIFICANT CHANGE UP (ref 0–6)
EPI CELLS # UR: SIGNIFICANT CHANGE UP
GLUCOSE SERPL-MCNC: 205 MG/DL — HIGH (ref 70–99)
GLUCOSE UR QL: NEGATIVE MG/DL — SIGNIFICANT CHANGE UP
HCT VFR BLD CALC: 39.8 % — SIGNIFICANT CHANGE UP (ref 39–50)
HGB BLD-MCNC: 13.5 G/DL — SIGNIFICANT CHANGE UP (ref 13–17)
IMM GRANULOCYTES NFR BLD AUTO: 1 % — SIGNIFICANT CHANGE UP (ref 0–1.5)
KETONES UR-MCNC: NEGATIVE — SIGNIFICANT CHANGE UP
LACTATE SERPL-SCNC: 2.1 MMOL/L — HIGH (ref 0.7–2)
LEUKOCYTE ESTERASE UR-ACNC: ABNORMAL
LYMPHOCYTES # BLD AUTO: 2.18 K/UL — SIGNIFICANT CHANGE UP (ref 1–3.3)
LYMPHOCYTES # BLD AUTO: 35.4 % — SIGNIFICANT CHANGE UP (ref 13–44)
MCHC RBC-ENTMCNC: 29.7 PG — SIGNIFICANT CHANGE UP (ref 27–34)
MCHC RBC-ENTMCNC: 33.9 GM/DL — SIGNIFICANT CHANGE UP (ref 32–36)
MCV RBC AUTO: 87.7 FL — SIGNIFICANT CHANGE UP (ref 80–100)
MONOCYTES # BLD AUTO: 0.68 K/UL — SIGNIFICANT CHANGE UP (ref 0–0.9)
MONOCYTES NFR BLD AUTO: 11.1 % — SIGNIFICANT CHANGE UP (ref 2–14)
NEUTROPHILS # BLD AUTO: 2.92 K/UL — SIGNIFICANT CHANGE UP (ref 1.8–7.4)
NEUTROPHILS NFR BLD AUTO: 47.4 % — SIGNIFICANT CHANGE UP (ref 43–77)
NITRITE UR-MCNC: NEGATIVE — SIGNIFICANT CHANGE UP
NRBC # BLD: 0 /100 WBCS — SIGNIFICANT CHANGE UP (ref 0–0)
PH UR: 5 — SIGNIFICANT CHANGE UP (ref 5–8)
PLATELET # BLD AUTO: 227 K/UL — SIGNIFICANT CHANGE UP (ref 150–400)
POTASSIUM SERPL-MCNC: 4.3 MMOL/L — SIGNIFICANT CHANGE UP (ref 3.5–5.3)
POTASSIUM SERPL-SCNC: 4.3 MMOL/L — SIGNIFICANT CHANGE UP (ref 3.5–5.3)
PROT SERPL-MCNC: 7 GM/DL — SIGNIFICANT CHANGE UP (ref 6–8.3)
PROT UR-MCNC: 15 MG/DL
RBC # BLD: 4.54 M/UL — SIGNIFICANT CHANGE UP (ref 4.2–5.8)
RBC # FLD: 12.5 % — SIGNIFICANT CHANGE UP (ref 10.3–14.5)
RBC CASTS # UR COMP ASSIST: SIGNIFICANT CHANGE UP /HPF (ref 0–4)
SODIUM SERPL-SCNC: 139 MMOL/L — SIGNIFICANT CHANGE UP (ref 135–145)
SP GR SPEC: 1.02 — SIGNIFICANT CHANGE UP (ref 1.01–1.02)
UROBILINOGEN FLD QL: NEGATIVE MG/DL — SIGNIFICANT CHANGE UP
WBC # BLD: 6.15 K/UL — SIGNIFICANT CHANGE UP (ref 3.8–10.5)
WBC # FLD AUTO: 6.15 K/UL — SIGNIFICANT CHANGE UP (ref 3.8–10.5)
WBC UR QL: SIGNIFICANT CHANGE UP

## 2021-08-31 PROCEDURE — 74174 CTA ABD&PLVS W/CONTRAST: CPT | Mod: 26,MC

## 2021-08-31 PROCEDURE — 99284 EMERGENCY DEPT VISIT MOD MDM: CPT

## 2021-08-31 PROCEDURE — 71110 X-RAY EXAM RIBS BIL 3 VIEWS: CPT | Mod: 26

## 2021-08-31 PROCEDURE — 72131 CT LUMBAR SPINE W/O DYE: CPT | Mod: 26,MC

## 2021-08-31 RX ORDER — ACETAMINOPHEN 500 MG
650 TABLET ORAL ONCE
Refills: 0 | Status: COMPLETED | OUTPATIENT
Start: 2021-08-31 | End: 2021-08-31

## 2021-08-31 RX ORDER — LIDOCAINE 4 G/100G
1 CREAM TOPICAL
Qty: 7 | Refills: 0
Start: 2021-08-31 | End: 2021-09-06

## 2021-08-31 RX ORDER — SODIUM CHLORIDE 9 MG/ML
1000 INJECTION INTRAMUSCULAR; INTRAVENOUS; SUBCUTANEOUS ONCE
Refills: 0 | Status: COMPLETED | OUTPATIENT
Start: 2021-08-31 | End: 2021-08-31

## 2021-08-31 RX ORDER — ACETAMINOPHEN 500 MG
1 TABLET ORAL
Qty: 14 | Refills: 0
Start: 2021-08-31 | End: 2021-09-06

## 2021-08-31 RX ADMIN — Medication 650 MILLIGRAM(S): at 11:36

## 2021-08-31 RX ADMIN — SODIUM CHLORIDE 1000 MILLILITER(S): 9 INJECTION INTRAMUSCULAR; INTRAVENOUS; SUBCUTANEOUS at 13:08

## 2021-08-31 RX ADMIN — Medication 650 MILLIGRAM(S): at 11:38

## 2021-08-31 NOTE — ED PROVIDER NOTE - PHYSICAL EXAMINATION
GEN: Awake, alert, interactive, NAD.  HEAD AND NECK: NC/AT. Airway patent. Neck supple.   EYES:  Clear b/l. EOMI. PERRL.   ENT: Moist mucus membranes.   CARDIAC: Regular rate, regular rhythm. No evident pedal edema.    RESP/CHEST: Normal respiratory effort with no use of accessory muscles or retractions. Clear throughout on auscultation.  ABD: soft, non-distended, non-tender. No rebound, no guarding.   BACK: No midline spinal TTP. No CVAT.   EXTREMITIES: Moving all extremities with no apparent deformities.   SKIN: Warm, dry, intact normal color. No rash.   NEURO: AOx3, CN II-XII grossly intact, no focal deficits.   PSYCH: Appropriate mood and affect. GEN: Awake, alert, interactive, NAD.  HEAD AND NECK: NC/AT. Airway patent. Neck supple.   EYES:  Clear b/l. EOMI. PERRL.   ENT: Moist mucus membranes.   CARDIAC: Regular rate, regular rhythm. No evident pedal edema.    RESP/CHEST: Normal respiratory effort with no use of accessory muscles or retractions. Clear throughout on auscultation.  ABD: Soft, non-distended, non-tender. No rebound, no guarding.   BACK: No midline spinal TTP. No CVAT.   EXTREMITIES: Moving all extremities with no apparent deformities.   SKIN: Warm, dry, intact normal color. No rash.   NEURO: AOx3, CN II-XII grossly intact, no focal deficits.   PSYCH: Appropriate mood and affect.

## 2021-08-31 NOTE — ED PROVIDER NOTE - CLINICAL SUMMARY MEDICAL DECISION MAKING FREE TEXT BOX
81 yo M w/PMH of alzheimer's, BPH, HLD presenting with sudden onset left flank zoey since 10am. AFVSS, well appearing and in NAD. No midline spinal TTP or CVAT. Plan to obtain, Tylenol, UA/C, CBC, CMP, lactate, CT lumbar, CT angio abdomen/pelvis. R/o triple A, renal stone, pyelonephritis, sciatica, lumbar herniation. 79yo M w/PMH of alzheimer's, BPH, HLD presenting with left flank pain since 10am. AFVSS, well appearing and in NAD. No midline spinal TTP or CVAT. Plan: Give Tylenol, Obtain UA/C, CBC, CMP, lactate, CT lumbar, CT angio abdomen/pelvis. R/o AAA, renal stone, pyelonephritis, sciatica, lumbar herniation. W/u significant for: No AAA, no renal stone / hydro, + fx R 7th/8th ribs. Add on rib series. Negative for other fx. Pt ambulatory. Given IS. Stable for d/c. Given scripts Tylenol, Lidoderm. Recommend PCP f/u. Return signs / symptoms d/w pt, wife. They understand and agree w/ this plan.

## 2021-08-31 NOTE — ED PROVIDER NOTE - PATIENT PORTAL LINK FT
You can access the FollowMyHealth Patient Portal offered by [facility] by registering at the following website: [email or web address]. By joining [employer]’s FollowMyHealth portal, you will also be able to view your health information using other applications (apps) compatible with our system. Declined

## 2021-08-31 NOTE — ED ADULT NURSE REASSESSMENT NOTE - NS ED NURSE REASSESS COMMENT FT1
patient mild agitated Md aware as per md patient OK to be discharge home heplock remove wife by bed side discharge as orders

## 2021-08-31 NOTE — ED ADULT TRIAGE NOTE - NS_BHTRGSOURCE_ED_A_ED_FT
Patient informed of below.  He verbalized his understanding.     Per Barbara:  Labs are normal with lower GFR; recommend following this up in one year. Encourage adequate hydration as discussed at visit.    Will drop paperwork off today.    We can call him once form is signed.      Natasha Logan

## 2021-08-31 NOTE — ED ADULT TRIAGE NOTE - TEMPERATURE IN FAHRENHEIT (DEGREES F)
Writer contacted the pharmacy and they told that the Diclofenac requires a PA. Informed pharmacy that we did not receive any fax with a PA request from them. The pharmacist states that there were a few faxes sent to the office, provided with pt's ID information. Writer sent request to the PA dept and then was able to start the PA via [employer] and CA the one through the PA dept. Called pt and informed that the med needs PA and that the process was started. It will take 1-3 business days before an answer.Patient verbalized understanding and had no further questions.   98.1

## 2021-08-31 NOTE — ED PROVIDER NOTE - OBJECTIVE STATEMENT
81 yo M w/PMH of alzheimer's dementia, BPH, HLD presents to the ED for left flank pain x today. Pt was yelling in pain at 9:30am this morning while wife was making breakfast. Pain is exacerbated with movement. Wife reports pt quick to forget things and that if pt complains of pain it must be really bothering him. Wife reports fall a few days ago into the grass, no LOC or head-strike, witnessed by wife. Denies fever/chills, cough, SOB, CP, abdominal pain, N/V/D, dysuria or hematuria. PMD Dr. Oreilly. No hx kidney stones.     PMH as above. PSH hernia. NKDA. Meds as listed. 79yo M w/PMH of alzheimer's dementia, BPH, HLD presents to the ED for left flank pain x today. Pt was yelling in pain at 9:30am this morning while wife was making breakfast. Pain is exacerbated with movement. Wife reports pt quick to forget things and that if pt complains of pain it must be really bothering him. Wife reports fall a few days ago into the grass, no LOC or head-strike, witnessed by wife. Denies fever/chills, cough, SOB, CP, abdominal pain, N/V/D, dysuria or hematuria. PMD Dr. Oreilly. No hx kidney stones.     PMH as above. PSH hernia. NKDA. Meds as listed.

## 2021-08-31 NOTE — ED ADULT NURSE NOTE - EXTENSIONS OF SELF_ADULT
Occupational Therapy Daily Treatment     Visit Count: 8  Plan of Care Dates: Initial: 6/6/2018 Through: 8/1/2018  Insurance Information: Work Injury Information:   Current Employer: [employer]   :    Restrictions: Off work   Full Duty Work Demands: light (10 - 20 lbs), medium (20 - 50 lbs), sitting >50% of the day, standing >50% of the day, computer/office work, lifting overhead, chest height lifting, lifting from floor, rotational/twisting motions  and needs to cut metal with metal scissors  Work 8-12 hours days.    Current Work Status: full time occupation: ; off work due to current condition  Claim Number: [id number]  Claim Status: claim open and in good standing     Next Referring Provider Visit:      Referred by: Damien Conway PA-C  Medical Diagnosis (from order): 816.01 (ICD-9-CM) - S62.646A (ICD-10-CM) - Closed nondisplaced fracture of proximal phalanx of right little finger  Treatment Diagnosis: Wrist/Hand Symptoms with Pain, Impaired Joint Mobility, Impaired Range of Motion, Impaired Motor Function/Muscle Performance, Impaired Motor Function and Sensory Integration and Movement Coordination Impairments  Insurance: 1. WC-KARINE  2. N/A     Date of onset/injury: 5/9/18  Diagnosis Precautions: splint, skin graft.  Fx prox phalanx D5   Chart reviewed: Relevant co-morbidities, allergies, tests and medications:       MD orders  Evaluate & Treat  Therapist discretion, splinting, ulnar gutter hand based dip free.  Edema management, wound care/scar management and AROM/AAROM/PROM  Modalities: Therapist discretion       SUBJECTIVE   Has not been using juancho strap.  Current Pain: 0/10.    Functional Change: limited use of right hand. Has been able to get off some scab and dried skin on the hand and wrist.    OBJECTIVE   eval 6/13/18 6/13/18  DPC to tip of digit  D2   3 cm  D3   4.1 cm  D4   5.5 cm    Treatment   Therapeutic Exercise:   · P/AA/AROM wrist and digits -  fingers and thumb- all planes.    · Gentle AA/PROM D5 all joints as tolerated.  Limited due to pain.     · Place and hold in digit flexion / ext  · Isolated MP extension  · Suggestion to use juancho strap on D4 /D5 to increase AROM of D5    Manual Therapy:   · Joint mob to wrist over blue wedge.  · Wound redressed with petroleum gauze and kerlix dressing over skin graft and wound on ulnar hand and distal forearm.  isotoner glove  · Areas of necrotic tissue were removed that were loose along with dried callouses. Pt felt no pain and was comfortable with amount of debridement.  She has been doing this at home.   ·     Current Home Program (not performed this date except as noted above):   Access Code: [email or web address]   URL: [email or web address]   Date: 06/13/2018   Prepared by: Bebo Parker     Exercises   Wrist Extension AROM - 5 reps - 3 sets - 3-5 hold   Seated Finger Composite Flexion Extension - 5 reps - 3 sets - 3-5 hold   Wrist Tendon Gliding - 5 reps - 3 sets - 3-5 hold   Finger O - 5 reps - 3 sets - 3-5 hold       ASSESSMENT   Increased AROM in digits after exercises.  Encouraged her to use juancho strap on D4/5 flexion limitations and decreased tendon excursion are affecting her active movement.  Continue to removed necrotic tissue at wound edges.  Wound is decreasing in size.    6/15Therapist contacted MD to clarify splint position and ROM on D5.  MD returned call after pt was seen.  MD okayed adjustment of ulnar gutter splint to intrinsic plus position and AA/AROM D5 is okay.     Pain after treatment: 0/10  Result of above outlined education: Verbalizes understanding and Demonstrates understanding    Goals:       To be obtained by end of this plan of care:  1. Patient independent with modified and progressed home exercise program.  2. Decrease involved right hand pain to 1/10 pain, for resumption of self-cares with affected hand and eliminate need for pain medication use.  3. Achieve active fingertip  to palm composite flexion for resumption of small object grasp and hold, ½ inch diameter tool use (eating utensil, toothbrush) and wring out wash cloth.  4. Achieve active finger extension sufficient to reach into pants pocket, wipe flat surface or face.   5. Achieve pinch / dexterity sufficient to  coins, button, tie shoes, pull zipper, type.  6. Achieve  and pinch strength to WFL for resumption of light grocery bag lift,  steering wheel, perform light home/yard maintenance tasks.  7. Patient/Client will be able to lift up to 50 pounds from floor to waist with proper body mechanics to assist with lifting activities at work.  8. Patient/Client will demonstrate  strength of wfl to cut metal with scissors at work.    PLAN   Edema management, wound care/scar management and AROM/AAROM/PROM.  AA/AROM D5.        THERAPY DAILY BILLING   Primary Insurance:  [facility]  Secondary Insurance: N/A    Evaluation Procedures:  No evaluation codes were used on this date of service    Timed Procedures:  Manual Therapy, 15 minutes  Therapeutic Exercise, 30 minutes    Untimed Procedures:  No untimed codes were used on this date of service    Total Treatment Time: 45 minutes       None

## 2021-08-31 NOTE — ED ADULT NURSE NOTE - OBJECTIVE STATEMENT
patient alert oriented to name only , in no acute distress as per wife Natasha patient had a fall on Friday as per wife he did not hit his head she witness the fall patient c/o of R flank pain , denied chest pain no difficulty breathing

## 2021-09-01 LAB
CULTURE RESULTS: SIGNIFICANT CHANGE UP
SPECIMEN SOURCE: SIGNIFICANT CHANGE UP

## 2021-09-03 ENCOUNTER — RX RENEWAL (OUTPATIENT)
Age: 81
End: 2021-09-03

## 2021-09-03 RX ORDER — QUETIAPINE FUMARATE 50 MG/1
50 TABLET ORAL
Qty: 30 | Refills: 2 | Status: ACTIVE | COMMUNITY
Start: 2021-03-21 | End: 1900-01-01

## 2021-09-05 ENCOUNTER — APPOINTMENT (OUTPATIENT)
Dept: INTERNAL MEDICINE | Facility: CLINIC | Age: 81
End: 2021-09-05
Payer: MEDICARE

## 2021-09-05 VITALS
DIASTOLIC BLOOD PRESSURE: 80 MMHG | HEIGHT: 69 IN | SYSTOLIC BLOOD PRESSURE: 120 MMHG | RESPIRATION RATE: 16 BRPM | WEIGHT: 210 LBS | BODY MASS INDEX: 31.1 KG/M2 | HEART RATE: 62 BPM | OXYGEN SATURATION: 97 %

## 2021-09-05 DIAGNOSIS — C43.9 MALIGNANT MELANOMA OF SKIN, UNSPECIFIED: ICD-10-CM

## 2021-09-05 DIAGNOSIS — S22.39XA FRACTURE OF ONE RIB, UNSPECIFIED SIDE, INITIAL ENCOUNTER FOR CLOSED FRACTURE: ICD-10-CM

## 2021-09-05 DIAGNOSIS — M79.643 PAIN IN UNSPECIFIED HAND: ICD-10-CM

## 2021-09-05 DIAGNOSIS — G30.9 ALZHEIMER'S DISEASE, UNSPECIFIED: ICD-10-CM

## 2021-09-05 DIAGNOSIS — Z86.73 PERSONAL HISTORY OF TRANSIENT ISCHEMIC ATTACK (TIA), AND CEREBRAL INFARCTION W/OUT RESIDUAL DEFICITS: ICD-10-CM

## 2021-09-05 DIAGNOSIS — R73.03 PREDIABETES.: ICD-10-CM

## 2021-09-05 DIAGNOSIS — R26.9 UNSPECIFIED ABNORMALITIES OF GAIT AND MOBILITY: ICD-10-CM

## 2021-09-05 DIAGNOSIS — F02.80 ALZHEIMER'S DISEASE, UNSPECIFIED: ICD-10-CM

## 2021-09-05 DIAGNOSIS — E53.8 DEFICIENCY OF OTHER SPECIFIED B GROUP VITAMINS: ICD-10-CM

## 2021-09-05 DIAGNOSIS — D49.0 NEOPLASM OF UNSPECIFIED BEHAVIOR OF DIGESTIVE SYSTEM: ICD-10-CM

## 2021-09-05 DIAGNOSIS — Z87.891 PERSONAL HISTORY OF NICOTINE DEPENDENCE: ICD-10-CM

## 2021-09-05 DIAGNOSIS — N40.0 BENIGN PROSTATIC HYPERPLASIA WITHOUT LOWER URINARY TRACT SYMPMS: ICD-10-CM

## 2021-09-05 DIAGNOSIS — E78.5 HYPERLIPIDEMIA, UNSPECIFIED: ICD-10-CM

## 2021-09-05 DIAGNOSIS — I10 ESSENTIAL (PRIMARY) HYPERTENSION: ICD-10-CM

## 2021-09-05 DIAGNOSIS — Z23 ENCOUNTER FOR IMMUNIZATION: ICD-10-CM

## 2021-09-05 PROCEDURE — 99214 OFFICE O/P EST MOD 30 MIN: CPT | Mod: 25

## 2021-09-05 PROCEDURE — G0008: CPT

## 2021-09-05 PROCEDURE — 36415 COLL VENOUS BLD VENIPUNCTURE: CPT

## 2021-09-05 PROCEDURE — 90662 IIV NO PRSV INCREASED AG IM: CPT

## 2021-09-05 RX ORDER — QUETIAPINE FUMARATE 25 MG/1
25 TABLET ORAL
Qty: 30 | Refills: 0 | Status: COMPLETED | COMMUNITY
Start: 2021-03-21

## 2021-09-05 NOTE — HEALTH RISK ASSESSMENT
[No] : In the past 12 months have you used drugs other than those required for medical reasons? No [Any fall with injury in past year] : Patient reported fall with injury in the past year [Medical reason not done] : Medical reason not done [] : No [Audit-CScore] : 0 [de-identified] : Dementia

## 2021-09-05 NOTE — REVIEW OF SYSTEMS
[Joint Pain] : joint pain [Confusion] : confusion [Unsteady Walk] : ataxia [Memory Loss] : memory loss [Negative] : Heme/Lymph [Muscle Pain] : no muscle pain [Joint Stiffness] : no joint stiffness [Muscle Weakness] : no muscle weakness [Back Pain] : no back pain [Joint Swelling] : no joint swelling [Headache] : no headache [Dizziness] : no dizziness [Fainting] : no fainting

## 2021-09-05 NOTE — HISTORY OF PRESENT ILLNESS
[Spouse] : spouse [de-identified] : 81 y/o male with a hx of HTN, HLD, predm, dementia, bph, gait abnormality, b12 def, melanoma s/p excisiions, hx tia for f/u.\par wife cares for him.\par Had been doing well. takes meds. walks and swims for exercise. Swimming on hiatus for the season and b/c covid 19 -\par diet - healthy. former smoker, no etoh.\par no c/o.\par With pain/arthritis at the hand - has been getting worse - with lt 4th finger trigger finger.  Occ pain at the back of the legs with walking - has nor been able to go for eval.  Stable.  \par Dementia - has been declining with dec in language skills. with some falls.  Has been trying to maintain falls precautions for the pt.  s/p recent fall from the porch to the grass with rt rib fractures.  Has been improved.  \par Of note CTA in the ER showed IPMN - I d/w pt's wife at length.  \par The tamsulosin has been less effective.  with inc in the nocturia - no change.   \par \par vaccines - tet '13\par had zostavax\par gets flu vaccine -\par Had covid vaccine #1\par \par derm - follows regularly.\par colon ca screening - defers due to the pt's med issues [FreeTextEntry1] : HTN, HLD, predm, dementia, bph, gait abnormality, b12 def, melanoma s/p excisiions, hx tia

## 2021-09-05 NOTE — PHYSICAL EXAM
[No Acute Distress] : no acute distress [Well Nourished] : well nourished [Well Developed] : well developed [Well-Appearing] : well-appearing [Normal Sclera/Conjunctiva] : normal sclera/conjunctiva [PERRL] : pupils equal round and reactive to light [EOMI] : extraocular movements intact [Normal Outer Ear/Nose] : the outer ears and nose were normal in appearance [No JVD] : no jugular venous distention [No Lymphadenopathy] : no lymphadenopathy [Supple] : supple [No Respiratory Distress] : no respiratory distress  [No Accessory Muscle Use] : no accessory muscle use [Clear to Auscultation] : lungs were clear to auscultation bilaterally [Normal Rate] : normal rate  [Regular Rhythm] : with a regular rhythm [Normal S1, S2] : normal S1 and S2 [No Murmur] : no murmur heard [No Carotid Bruits] : no carotid bruits [No Abdominal Bruit] : a ~M bruit was not heard ~T in the abdomen [Pedal Pulses Present] : the pedal pulses are present [No Edema] : there was no peripheral edema [Soft] : abdomen soft [Non Tender] : non-tender [Non-distended] : non-distended [No Masses] : no abdominal mass palpated [No HSM] : no HSM [Normal Bowel Sounds] : normal bowel sounds [Normal Posterior Cervical Nodes] : no posterior cervical lymphadenopathy [Normal Anterior Cervical Nodes] : no anterior cervical lymphadenopathy [No CVA Tenderness] : no CVA  tenderness [No Spinal Tenderness] : no spinal tenderness [No Joint Swelling] : no joint swelling [Grossly Normal Strength/Tone] : grossly normal strength/tone [Coordination Grossly Intact] : coordination grossly intact [Speech Grossly Normal] : speech grossly normal [Normal Voice/Communication] : normal voice/communication [de-identified] : confusion, some interaction [de-identified] : dementia

## 2021-09-05 NOTE — COUNSELING
[Potential consequences of obesity discussed] : Potential consequences of obesity discussed [Benefits of weight loss discussed] : Benefits of weight loss discussed [Encouraged to increase physical activity] : Encouraged to increase physical activity [Decrease Portions] : decrease portions [Needs reinforcement, provided] : Patient needs reinforcement on understanding of disease, goals and obesity follow-up plan; reinforcement was provided [Limited decision making ability] : Limited decision making ability [Good understanding] : Patient has a good understanding of lifestyle changes and steps needed to achieve self management goal [Fall prevention counseling provided] : Fall prevention counseling provided [Adequate lighting] : Adequate lighting [No throw rugs] : No throw rugs [Use proper foot wear] : Use proper foot wear [Use recommended devices] : Use recommended devices

## 2021-09-10 LAB
ALBUMIN SERPL ELPH-MCNC: 4.4 G/DL
ALP BLD-CCNC: 81 U/L
ALT SERPL-CCNC: 35 U/L
AMYLASE/CREAT SERPL: 67 U/L
ANION GAP SERPL CALC-SCNC: 15 MMOL/L
AST SERPL-CCNC: 26 U/L
BASOPHILS # BLD AUTO: 0.03 K/UL
BASOPHILS NFR BLD AUTO: 0.5 %
BILIRUB SERPL-MCNC: 0.7 MG/DL
BUN SERPL-MCNC: 12 MG/DL
CALCIUM SERPL-MCNC: 9.2 MG/DL
CHLORIDE SERPL-SCNC: 102 MMOL/L
CHOLEST SERPL-MCNC: 134 MG/DL
CO2 SERPL-SCNC: 22 MMOL/L
CREAT SERPL-MCNC: 1.05 MG/DL
EOSINOPHIL # BLD AUTO: 0.3 K/UL
EOSINOPHIL NFR BLD AUTO: 4.7 %
ESTIMATED AVERAGE GLUCOSE: 148 MG/DL
GLUCOSE SERPL-MCNC: 132 MG/DL
HBA1C MFR BLD HPLC: 6.8 %
HCT VFR BLD CALC: 43.8 %
HDLC SERPL-MCNC: 35 MG/DL
HGB BLD-MCNC: 14.2 G/DL
IMM GRANULOCYTES NFR BLD AUTO: 0.6 %
LDLC SERPL CALC-MCNC: 64 MG/DL
LPL SERPL-CCNC: 31 U/L
LYMPHOCYTES # BLD AUTO: 2.19 K/UL
LYMPHOCYTES NFR BLD AUTO: 34.4 %
MAN DIFF?: NORMAL
MCHC RBC-ENTMCNC: 29.6 PG
MCHC RBC-ENTMCNC: 32.4 GM/DL
MCV RBC AUTO: 91.3 FL
MONOCYTES # BLD AUTO: 0.74 K/UL
MONOCYTES NFR BLD AUTO: 11.6 %
NEUTROPHILS # BLD AUTO: 3.06 K/UL
NEUTROPHILS NFR BLD AUTO: 48.2 %
NONHDLC SERPL-MCNC: 99 MG/DL
PLATELET # BLD AUTO: 254 K/UL
POTASSIUM SERPL-SCNC: 4.5 MMOL/L
PROT SERPL-MCNC: 7.1 G/DL
RBC # BLD: 4.8 M/UL
RBC # FLD: 12.5 %
SODIUM SERPL-SCNC: 140 MMOL/L
TRIGL SERPL-MCNC: 173 MG/DL
VIT B12 SERPL-MCNC: 910 PG/ML
WBC # FLD AUTO: 6.36 K/UL

## 2021-10-09 NOTE — ED ADULT NURSE NOTE - SUICIDE SCREENING QUESTION 2
Assumed care of patient. Report received from Francie DALE. Pt sleeping comfortably at this time.   No

## 2021-10-25 ENCOUNTER — RX RENEWAL (OUTPATIENT)
Age: 81
End: 2021-10-25

## 2021-10-25 RX ORDER — AMLODIPINE BESYLATE 5 MG/1
5 TABLET ORAL DAILY
Qty: 90 | Refills: 3 | Status: ACTIVE | COMMUNITY
Start: 2019-06-02 | End: 1900-01-01

## 2021-11-28 ENCOUNTER — INPATIENT (INPATIENT)
Facility: HOSPITAL | Age: 81
LOS: 5 days | Discharge: HOSPICE | End: 2021-12-04
Attending: HOSPITALIST | Admitting: HOSPITALIST
Payer: MEDICARE

## 2021-11-28 VITALS
SYSTOLIC BLOOD PRESSURE: 140 MMHG | TEMPERATURE: 103 F | DIASTOLIC BLOOD PRESSURE: 89 MMHG | RESPIRATION RATE: 32 BRPM | OXYGEN SATURATION: 99 % | HEIGHT: 69 IN | HEART RATE: 106 BPM | WEIGHT: 205.03 LBS

## 2021-11-28 PROCEDURE — 93010 ELECTROCARDIOGRAM REPORT: CPT

## 2021-11-28 PROCEDURE — 99285 EMERGENCY DEPT VISIT HI MDM: CPT

## 2021-11-28 NOTE — ED ADULT TRIAGE NOTE - CHIEF COMPLAINT QUOTE
As per EMS states pt feels waarm, difficulty urinating and walking As per EMS states pt feels warm, difficulty urinating and walking

## 2021-11-28 NOTE — ED ADULT TRIAGE NOTE - CCCP TRG CHIEF CMPLNT
What Type Of Note Output Would You Prefer (Optional)?: Standard Output What Is The Reason For Today's Visit?: Full Body Skin Examination with No Concerns What Is The Reason For Today's Visit? (Being Monitored For X): the re-examination of lesions previously examined Additional History: Jero has not noticed any new, changing, non healing or other concerning areas since his last exam. urinary symptoms

## 2021-11-29 ENCOUNTER — NON-APPOINTMENT (OUTPATIENT)
Age: 81
End: 2021-11-29

## 2021-11-29 DIAGNOSIS — F03.91 UNSPECIFIED DEMENTIA WITH BEHAVIORAL DISTURBANCE: ICD-10-CM

## 2021-11-29 DIAGNOSIS — G30.9 ALZHEIMER'S DISEASE, UNSPECIFIED: ICD-10-CM

## 2021-11-29 DIAGNOSIS — G93.41 METABOLIC ENCEPHALOPATHY: ICD-10-CM

## 2021-11-29 DIAGNOSIS — N40.0 BENIGN PROSTATIC HYPERPLASIA WITHOUT LOWER URINARY TRACT SYMPTOMS: ICD-10-CM

## 2021-11-29 DIAGNOSIS — Z29.9 ENCOUNTER FOR PROPHYLACTIC MEASURES, UNSPECIFIED: ICD-10-CM

## 2021-11-29 DIAGNOSIS — E78.5 HYPERLIPIDEMIA, UNSPECIFIED: ICD-10-CM

## 2021-11-29 DIAGNOSIS — I10 ESSENTIAL (PRIMARY) HYPERTENSION: ICD-10-CM

## 2021-11-29 DIAGNOSIS — E87.2 ACIDOSIS: ICD-10-CM

## 2021-11-29 LAB
ALBUMIN SERPL ELPH-MCNC: 3.8 G/DL — SIGNIFICANT CHANGE UP (ref 3.3–5)
ALP SERPL-CCNC: 79 U/L — SIGNIFICANT CHANGE UP (ref 40–120)
ALT FLD-CCNC: 38 U/L — SIGNIFICANT CHANGE UP (ref 12–78)
ANION GAP SERPL CALC-SCNC: 6 MMOL/L — SIGNIFICANT CHANGE UP (ref 5–17)
APPEARANCE UR: CLEAR — SIGNIFICANT CHANGE UP
APTT BLD: 33.9 SEC — SIGNIFICANT CHANGE UP (ref 27.5–35.5)
AST SERPL-CCNC: 16 U/L — SIGNIFICANT CHANGE UP (ref 15–37)
BACTERIA # UR AUTO: ABNORMAL
BASOPHILS # BLD AUTO: 0.02 K/UL — SIGNIFICANT CHANGE UP (ref 0–0.2)
BASOPHILS NFR BLD AUTO: 0.1 % — SIGNIFICANT CHANGE UP (ref 0–2)
BILIRUB SERPL-MCNC: 0.8 MG/DL — SIGNIFICANT CHANGE UP (ref 0.2–1.2)
BILIRUB UR-MCNC: NEGATIVE — SIGNIFICANT CHANGE UP
BUN SERPL-MCNC: 18 MG/DL — SIGNIFICANT CHANGE UP (ref 7–23)
CALCIUM SERPL-MCNC: 9.1 MG/DL — SIGNIFICANT CHANGE UP (ref 8.5–10.1)
CHLORIDE SERPL-SCNC: 106 MMOL/L — SIGNIFICANT CHANGE UP (ref 96–108)
CO2 SERPL-SCNC: 28 MMOL/L — SIGNIFICANT CHANGE UP (ref 22–31)
COLOR SPEC: YELLOW — SIGNIFICANT CHANGE UP
CREAT SERPL-MCNC: 1.35 MG/DL — HIGH (ref 0.5–1.3)
DIFF PNL FLD: ABNORMAL
EOSINOPHIL # BLD AUTO: 0.01 K/UL — SIGNIFICANT CHANGE UP (ref 0–0.5)
EOSINOPHIL NFR BLD AUTO: 0.1 % — SIGNIFICANT CHANGE UP (ref 0–6)
EPI CELLS # UR: SIGNIFICANT CHANGE UP
FLUAV AG NPH QL: SIGNIFICANT CHANGE UP
FLUBV AG NPH QL: SIGNIFICANT CHANGE UP
GLUCOSE SERPL-MCNC: 207 MG/DL — HIGH (ref 70–99)
GLUCOSE UR QL: 50 MG/DL
HCT VFR BLD CALC: 43.9 % — SIGNIFICANT CHANGE UP (ref 39–50)
HGB BLD-MCNC: 14.6 G/DL — SIGNIFICANT CHANGE UP (ref 13–17)
IMM GRANULOCYTES NFR BLD AUTO: 0.5 % — SIGNIFICANT CHANGE UP (ref 0–1.5)
INR BLD: 1.2 RATIO — HIGH (ref 0.88–1.16)
KETONES UR-MCNC: NEGATIVE — SIGNIFICANT CHANGE UP
LACTATE SERPL-SCNC: 1.2 MMOL/L — SIGNIFICANT CHANGE UP (ref 0.7–2)
LACTATE SERPL-SCNC: 6.4 MMOL/L — CRITICAL HIGH (ref 0.7–2)
LEUKOCYTE ESTERASE UR-ACNC: NEGATIVE — SIGNIFICANT CHANGE UP
LYMPHOCYTES # BLD AUTO: 1.92 K/UL — SIGNIFICANT CHANGE UP (ref 1–3.3)
LYMPHOCYTES # BLD AUTO: 12.6 % — LOW (ref 13–44)
MCHC RBC-ENTMCNC: 29.9 PG — SIGNIFICANT CHANGE UP (ref 27–34)
MCHC RBC-ENTMCNC: 33.3 GM/DL — SIGNIFICANT CHANGE UP (ref 32–36)
MCV RBC AUTO: 89.8 FL — SIGNIFICANT CHANGE UP (ref 80–100)
MONOCYTES # BLD AUTO: 1.4 K/UL — HIGH (ref 0–0.9)
MONOCYTES NFR BLD AUTO: 9.2 % — SIGNIFICANT CHANGE UP (ref 2–14)
NEUTROPHILS # BLD AUTO: 11.86 K/UL — HIGH (ref 1.8–7.4)
NEUTROPHILS NFR BLD AUTO: 77.5 % — HIGH (ref 43–77)
NITRITE UR-MCNC: NEGATIVE — SIGNIFICANT CHANGE UP
NRBC # BLD: 0 /100 WBCS — SIGNIFICANT CHANGE UP (ref 0–0)
PH UR: 5 — SIGNIFICANT CHANGE UP (ref 5–8)
PLATELET # BLD AUTO: 195 K/UL — SIGNIFICANT CHANGE UP (ref 150–400)
POTASSIUM SERPL-MCNC: 4.1 MMOL/L — SIGNIFICANT CHANGE UP (ref 3.5–5.3)
POTASSIUM SERPL-SCNC: 4.1 MMOL/L — SIGNIFICANT CHANGE UP (ref 3.5–5.3)
PROT SERPL-MCNC: 7.9 GM/DL — SIGNIFICANT CHANGE UP (ref 6–8.3)
PROT UR-MCNC: 15 MG/DL
PROTHROM AB SERPL-ACNC: 13.8 SEC — HIGH (ref 10.6–13.6)
RBC # BLD: 4.89 M/UL — SIGNIFICANT CHANGE UP (ref 4.2–5.8)
RBC # FLD: 12.7 % — SIGNIFICANT CHANGE UP (ref 10.3–14.5)
RBC CASTS # UR COMP ASSIST: ABNORMAL /HPF (ref 0–4)
SARS-COV-2 RNA SPEC QL NAA+PROBE: SIGNIFICANT CHANGE UP
SODIUM SERPL-SCNC: 140 MMOL/L — SIGNIFICANT CHANGE UP (ref 135–145)
SP GR SPEC: 1.02 — SIGNIFICANT CHANGE UP (ref 1.01–1.02)
UROBILINOGEN FLD QL: NEGATIVE MG/DL — SIGNIFICANT CHANGE UP
WBC # BLD: 15.28 K/UL — HIGH (ref 3.8–10.5)
WBC # FLD AUTO: 15.28 K/UL — HIGH (ref 3.8–10.5)
WBC UR QL: ABNORMAL

## 2021-11-29 PROCEDURE — 71045 X-RAY EXAM CHEST 1 VIEW: CPT | Mod: 26

## 2021-11-29 PROCEDURE — 12345: CPT | Mod: NC

## 2021-11-29 PROCEDURE — 71275 CT ANGIOGRAPHY CHEST: CPT | Mod: 26

## 2021-11-29 PROCEDURE — 90792 PSYCH DIAG EVAL W/MED SRVCS: CPT

## 2021-11-29 PROCEDURE — 99222 1ST HOSP IP/OBS MODERATE 55: CPT

## 2021-11-29 RX ORDER — METOPROLOL TARTRATE 50 MG
25 TABLET ORAL
Refills: 0 | Status: DISCONTINUED | OUTPATIENT
Start: 2021-11-29 | End: 2021-12-01

## 2021-11-29 RX ORDER — HEPARIN SODIUM 5000 [USP'U]/ML
5000 INJECTION INTRAVENOUS; SUBCUTANEOUS EVERY 12 HOURS
Refills: 0 | Status: DISCONTINUED | OUTPATIENT
Start: 2021-11-29 | End: 2021-12-04

## 2021-11-29 RX ORDER — MEMANTINE HYDROCHLORIDE 10 MG/1
10 TABLET ORAL AT BEDTIME
Refills: 0 | Status: DISCONTINUED | OUTPATIENT
Start: 2021-11-30 | End: 2021-12-01

## 2021-11-29 RX ORDER — CITALOPRAM 10 MG/1
20 TABLET, FILM COATED ORAL DAILY
Refills: 0 | Status: DISCONTINUED | OUTPATIENT
Start: 2021-11-29 | End: 2021-12-04

## 2021-11-29 RX ORDER — ATORVASTATIN CALCIUM 80 MG/1
10 TABLET, FILM COATED ORAL AT BEDTIME
Refills: 0 | Status: DISCONTINUED | OUTPATIENT
Start: 2021-11-29 | End: 2021-12-01

## 2021-11-29 RX ORDER — QUETIAPINE FUMARATE 200 MG/1
50 TABLET, FILM COATED ORAL AT BEDTIME
Refills: 0 | Status: DISCONTINUED | OUTPATIENT
Start: 2021-11-29 | End: 2021-11-30

## 2021-11-29 RX ORDER — CEFTRIAXONE 500 MG/1
1000 INJECTION, POWDER, FOR SOLUTION INTRAMUSCULAR; INTRAVENOUS ONCE
Refills: 0 | Status: COMPLETED | OUTPATIENT
Start: 2021-11-29 | End: 2021-11-29

## 2021-11-29 RX ORDER — TAMSULOSIN HYDROCHLORIDE 0.4 MG/1
0.4 CAPSULE ORAL AT BEDTIME
Refills: 0 | Status: DISCONTINUED | OUTPATIENT
Start: 2021-11-29 | End: 2021-12-01

## 2021-11-29 RX ORDER — SODIUM CHLORIDE 9 MG/ML
2200 INJECTION INTRAMUSCULAR; INTRAVENOUS; SUBCUTANEOUS ONCE
Refills: 0 | Status: COMPLETED | OUTPATIENT
Start: 2021-11-29 | End: 2021-11-29

## 2021-11-29 RX ORDER — ACETAMINOPHEN 500 MG
650 TABLET ORAL ONCE
Refills: 0 | Status: COMPLETED | OUTPATIENT
Start: 2021-11-29 | End: 2021-11-29

## 2021-11-29 RX ORDER — MEMANTINE HYDROCHLORIDE 10 MG/1
10 TABLET ORAL DAILY
Refills: 0 | Status: DISCONTINUED | OUTPATIENT
Start: 2021-11-29 | End: 2021-11-29

## 2021-11-29 RX ORDER — DIPHENHYDRAMINE HCL 50 MG
25 CAPSULE ORAL ONCE
Refills: 0 | Status: COMPLETED | OUTPATIENT
Start: 2021-11-29 | End: 2021-11-29

## 2021-11-29 RX ORDER — HALOPERIDOL DECANOATE 100 MG/ML
5 INJECTION INTRAMUSCULAR ONCE
Refills: 0 | Status: COMPLETED | OUTPATIENT
Start: 2021-11-29 | End: 2021-11-29

## 2021-11-29 RX ORDER — QUETIAPINE FUMARATE 200 MG/1
12.5 TABLET, FILM COATED ORAL EVERY 6 HOURS
Refills: 0 | Status: DISCONTINUED | OUTPATIENT
Start: 2021-11-29 | End: 2021-12-04

## 2021-11-29 RX ORDER — CEFTRIAXONE 500 MG/1
1000 INJECTION, POWDER, FOR SOLUTION INTRAMUSCULAR; INTRAVENOUS EVERY 24 HOURS
Refills: 0 | Status: DISCONTINUED | OUTPATIENT
Start: 2021-11-29 | End: 2021-12-01

## 2021-11-29 RX ADMIN — HEPARIN SODIUM 5000 UNIT(S): 5000 INJECTION INTRAVENOUS; SUBCUTANEOUS at 06:55

## 2021-11-29 RX ADMIN — CEFTRIAXONE 100 MILLIGRAM(S): 500 INJECTION, POWDER, FOR SOLUTION INTRAMUSCULAR; INTRAVENOUS at 21:56

## 2021-11-29 RX ADMIN — HALOPERIDOL DECANOATE 5 MILLIGRAM(S): 100 INJECTION INTRAMUSCULAR at 02:42

## 2021-11-29 RX ADMIN — CEFTRIAXONE 1000 MILLIGRAM(S): 500 INJECTION, POWDER, FOR SOLUTION INTRAMUSCULAR; INTRAVENOUS at 02:21

## 2021-11-29 RX ADMIN — SODIUM CHLORIDE 2200 MILLILITER(S): 9 INJECTION INTRAMUSCULAR; INTRAVENOUS; SUBCUTANEOUS at 00:56

## 2021-11-29 RX ADMIN — Medication 2 MILLIGRAM(S): at 00:58

## 2021-11-29 RX ADMIN — CITALOPRAM 20 MILLIGRAM(S): 10 TABLET, FILM COATED ORAL at 11:29

## 2021-11-29 RX ADMIN — Medication 25 MILLIGRAM(S): at 02:42

## 2021-11-29 RX ADMIN — SODIUM CHLORIDE 2200 MILLILITER(S): 9 INJECTION INTRAMUSCULAR; INTRAVENOUS; SUBCUTANEOUS at 03:22

## 2021-11-29 RX ADMIN — CEFTRIAXONE 100 MILLIGRAM(S): 500 INJECTION, POWDER, FOR SOLUTION INTRAMUSCULAR; INTRAVENOUS at 00:56

## 2021-11-29 RX ADMIN — MEMANTINE HYDROCHLORIDE 10 MILLIGRAM(S): 10 TABLET ORAL at 11:29

## 2021-11-29 NOTE — SWALLOW BEDSIDE ASSESSMENT ADULT - SLP GENERAL OBSERVATIONS
pt seen bedside, alert given mod tactile/auditory prompts and ?oriented to self. pt pleasantly confused, some gestures to communicate- smile, head shake or fillers "um" and he followed models for directions. fleeting eye contact with SLP, pt kept his eyes closed most of the time. wife present pt seen bedside, alert given mod tactile/auditory prompts and ?oriented to self. pt pleasantly confused, some gestures to communicate- smile, head shake or fillers "um" and he followed models for directions. fleeting eye contact with SLP, pt kept his eyes closed most of the time and lethargy may have been 2/2 medication for agitation. wife present

## 2021-11-29 NOTE — H&P ADULT - NSHPLABSRESULTS_GEN_ALL_CORE
Recent Vitals  T(C): 37.1 (21 @ 05:21), Max: 39.2 (21 @ 23:37)  HR: 96 (21 @ 05:21) (88 - 106)  BP: 133/84 (21 @ 05:21) (132/61 - 140/89)  RR: 23 (21 @ 05:21) (20 - 32)  SpO2: 97% (21 @ 05:21) (95% - 99%)                        14.6   15.28 )-----------( 195      ( 2021 00:44 )             43.9         140  |  106  |  18  ----------------------------<  207<H>  4.1   |  28  |  1.35<H>    Ca    9.1      2021 00:44    TPro  7.9  /  Alb  3.8  /  TBili  0.8  /  DBili  x   /  AST  16  /  ALT  38  /  AlkPhos  79      PT/INR - ( 2021 00:44 )   PT: 13.8 sec;   INR: 1.20 ratio         PTT - ( 2021 00:44 )  PTT:33.9 sec  LIVER FUNCTIONS - ( 2021 00:44 )  Alb: 3.8 g/dL / Pro: 7.9 gm/dL / ALK PHOS: 79 U/L / ALT: 38 U/L / AST: 16 U/L / GGT: x           Urinalysis Basic - ( 2021 03:26 )    Color: Yellow / Appearance: Clear / S.020 / pH: x  Gluc: x / Ketone: Negative  / Bili: Negative / Urobili: Negative mg/dL   Blood: x / Protein: 15 mg/dL / Nitrite: Negative   Leuk Esterase: Negative / RBC: 3-5 /HPF / WBC 6-10   Sq Epi: x / Non Sq Epi: Occasional / Bacteria: Moderate        Home Medications:

## 2021-11-29 NOTE — H&P ADULT - ASSESSMENT
Patient is an 80M with a PMH of advanced dementia, HTN, HLD, BPH  who presents to the ED for AMS.  Patient mostly nonverbal, currently unarousable due to recently receiving medication for sedation.  Patient unable to provide history.  Wife - Natasha - at the bedside.  Patient reportedly has had increased lethargy today, is not following his normal routine at home.  Patient typically able to walk short distances inside his home but now is unable to get up to use the bathroom.  Wife noted yestetrday evening that patient had subjective chills, shakes, and was diaphoretic.  Brought patient to the ED for evaluation.  Wife notes that patient has severe dementia with frequent violent outburts at night (patient was a .)  No other complaints.  Labs show leukocytosis and lactic acidosis.  Currently SpO2 94% on 4L via NC.  Will admit to med surg.      IMPROVE VTE Individual Risk Assessment          RISK                                                          Points  [  ] Previous VTE                                                3  [  ] Thrombophilia                                             2  [  ] Lower limb paralysis                                    2        (unable to hold up >15 seconds)    [  ] Current Cancer                                             2         (within 6 months)  [  ] Immobilization > 24 hrs                              1  [  ] ICU/CCU stay > 24 hours                            1  [  ] Age > 60                                                    1    IMPROVE VTE Score - 1

## 2021-11-29 NOTE — BH CONSULTATION LIAISON ASSESSMENT NOTE - HPI (INCLUDE ILLNESS QUALITY, SEVERITY, DURATION, TIMING, CONTEXT, MODIFYING FACTORS, ASSOCIATED SIGNS AND SYMPTOMS)
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 + leukocytosis to 15, lactate 6.4 > 1.2. CXR w/o evidence infectious process, UA + though to leuk est. + agitated, AMS in ED; On Namenda 10mg po qhs, Seroquel 50mg po qhs and Celexa 20mg PO qd.     ISTOP Reference #:[id number] no record of patient  81 yo  male, retired , noncaregiver, domiciled with wife in a private home, with advanced-stage Alzheimer's Dementia with Behavioral Disturbances (aggressive / violent outbursts, night terrors), diagnosed 10 years ago, BIB EMS from home for difficult walking and urinating. + leukocytosis to 15, lactate 6.4 > 1.2. CXR w/o evidence infectious process, UA + though to leuk est. + agitated, AMS in ED; On Namenda 10mg po qhs, Seroquel 50mg po qhs and Celexa 20mg PO qd.     EXAM: Patient is making passive eye contact, does not engage verbally but reacts to his name. He indicates to staff he needs ot use the restroom. Two person assist - Pt with difficulty sitting/standing up; short hesitant steps; decreased stride/pace; minimal arm swing. Stares at Writer and does not respond to direction/commands.     ISTOP Reference #:[id number] no record of patient

## 2021-11-29 NOTE — ED PROVIDER NOTE - PROGRESS NOTE DETAILS
Resting comfortably, in NAD. Wife now at beside. W/u significant for: + leukocytosis to 15, lactate 6.4 > 1.2. CXR w/o evidence infectious process, UA pending. Will admit to medicine (d/w Dr Singletary) for sepsis, unknown source. Pt wife understands / agrees w/ this plan.

## 2021-11-29 NOTE — BH CONSULTATION LIAISON ASSESSMENT NOTE - RISK ASSESSMENT
low risk for intentional, planned out and executed harm to self or others given advanced age, physical frailty and multi-domain cognitive deficits. More likely to unintentionally/accidentally lash out at caretakers during ADL assistance or hurt self by trying to climb out of bed/pulls lines etc secondary to his chronically confused state.

## 2021-11-29 NOTE — H&P ADULT - HISTORY OF PRESENT ILLNESS
Patient is an 80M with a PMH of advanced dementia, HTN, HLD, BPH  who presents to the ED for AMS.  Patient mostly nonverbal, currently unarousable due to recently receiving medication for sedation.  Patient unable to provide history.  Wife - Natasha - at the bedside.  Patient reportedly has had increased lethargy today, is not following his normal routine at home.  Patient typically able to walk short distances inside his home but now is unable to get up to use the bathroom.  Wife noted yesterday evening that patient had subjective chills, shakes, and was diaphoretic.  Brought patient to the ED for evaluation.  Wife notes that patient has severe dementia with frequent violent outbursts at night (patient was a .)  No other complaints.  Labs show leukocytosis and lactic acidosis.  Currently SpO2 94% on 4L via NC.  Will admit to med surg.

## 2021-11-29 NOTE — SWALLOW BEDSIDE ASSESSMENT ADULT - ASR SWALLOW DENTITION
as per wife - loose fitting dentures as per wife - loose fitting dentures. dentures not in during assessment

## 2021-11-29 NOTE — SWALLOW BEDSIDE ASSESSMENT ADULT - H & P REVIEW
Patient is an 80M with a PMH of advanced dementia, HTN, HLD, BPH  who presents to the ED for AMS.  Patient mostly nonverbal, currently unarousable due to recently receiving medication for sedation.  Patient unable to provide history.  Wife - Natasha - at the bedside.  Patient reportedly has had increased lethargy today, is not following his normal routine at home.  Patient typically able to walk short distances inside his home but now is unable to get up to use the bathroom.  Wife noted yesterday evening that patient had subjective chills, shakes, and was diaphoretic.  Brought patient to the ED for evaluation.  Wife notes that patient has severe dementia with frequent violent outbursts at night (patient was a .)  No other complaints.  Labs show leukocytosis and lactic acidosis.  Currently SpO2 94% on 4L via NC.  Will admit to med surg./yes

## 2021-11-29 NOTE — ED PROVIDER NOTE - OBJECTIVE STATEMENT
81 yo M w/PMH of Alzheimer's Disease presents to the ED BIBEMS for urinary symptoms w/ fever. Per EMS pt has difficult walking and urinating. 81yo M w/PMH of Alzheimer's Disease presents to the ED BIBEMS for urinary symptoms w/ fever. Per EMS pt has difficult walking and urinating.    Per wife, in WR: Pt w/ hx advanced Alzheimer's diagnosis 10yrs ago, pt w/ aggressive / violent outbursts, night terrors (former ). Pt went to bed 1930, watched TV / had dinner, was in normal state of health. Wife noted pt resistant to get up to walk to bathroom, which is unusual as he typically goes Q2hrs in night. Denies fever, cough, vomiting, diarrhea, decreased or foul smelling urine at home. Pt lives at home w/ wife.

## 2021-11-29 NOTE — PATIENT PROFILE ADULT - NSPROPTRIGHTNOTIFY_GEN_A_NUR
No working or driving until cleared by your surgeon. No creams or ointments to incision site. No submerging incision in water, tubs, or pools. No bending, twisting, lifting greater than 10 lbs. No lifting overhead    
declines

## 2021-11-29 NOTE — BH CONSULTATION LIAISON ASSESSMENT NOTE - FAMILY DETAILS
Screening Questionnaire for Adult Immunization    Are you sick today?   No   Do you have allergies to medications, food, a vaccine component or latex?   No   Have you ever had a serious reaction after receiving a vaccination?   No   Do you have a long-term health problem with heart disease, lung disease, asthma, kidney disease, metabolic disease (e.g. diabetes), anemia, or other blood disorder?   No   Do you have cancer, leukemia, HIV/AIDS, or any other immune system problem?   No   In the past 3 months, have you taken medications that affect  your immune system, such as prednisone, other steroids, or anticancer drugs; drugs for the treatment of rheumatoid arthritis, Crohn s disease, or psoriasis; or have you had radiation treatments?   No   Have you had a seizure, or a brain or other nervous system problem?   No   During the past year, have you received a transfusion of blood or blood     products, or been given immune (gamma) globulin or antiviral drug?   No   For women: Are you pregnant or is there a chance you could become        pregnant during the next month?   No   Have you received any vaccinations in the past 4 weeks?   No     Immunization questionnaire answers were all negative.      MNVFC doesn't apply on this patient    Per orders of Maria G Mcfarland, injection of Rabies given by Anna Regan. Patient instructed to remain in clinic for 20 minutes afterwards, and to report any adverse reaction to me immediately.       Screening performed by Anna Regan on 6/15/2017 at 10:10 AM.     wife

## 2021-11-29 NOTE — BH CONSULTATION LIAISON ASSESSMENT NOTE - NSBHCHARTREVIEWLAB_PSY_A_CORE FT
11-29    140  |  106  |  18  ----------------------------<  207<H>  4.1   |  28  |  1.35<H>    Ca    9.1      29 Nov 2021 00:44    TPro  7.9  /  Alb  3.8  /  TBili  0.8  /  DBili  x   /  AST  16  /  ALT  38  /  AlkPhos  79  11-29

## 2021-11-29 NOTE — PROGRESS NOTE ADULT - SUBJECTIVE AND OBJECTIVE BOX
[facility] Division of Hospital Medicine    Chief Complaint: AMS     SUBJECTIVE / OVERNIGHT EVENTS: Patient was restless. He continues to require 4L NC.     Unable to assess 2/2 lethargy.     MEDICATIONS  (STANDING):  atorvastatin 10 milliGRAM(s) Oral at bedtime  cefTRIAXone   IVPB 1000 milliGRAM(s) IV Intermittent every 24 hours  citalopram 20 milliGRAM(s) Oral daily  heparin   Injectable 5000 Unit(s) SubCutaneous every 12 hours  memantine 10 milliGRAM(s) Oral daily  metoprolol tartrate 25 milliGRAM(s) Oral two times a day  QUEtiapine 50 milliGRAM(s) Oral at bedtime  tamsulosin 0.4 milliGRAM(s) Oral at bedtime    MEDICATIONS  (PRN):        I&O's Summary      PHYSICAL EXAM:  Vital Signs Last 24 Hrs  T(C): 36.6 (2021 11:55), Max: 39.2 (2021 23:37)  T(F): 97.8 (2021 11:55), Max: 102.6 (2021 23:37)  HR: 79 (2021 11:55) (79 - 106)  BP: 107/65 (2021 11:55) (107/65 - 147/74)  BP(mean): --  RR: 19 (2021 11:55) (19 - 32)  SpO2: 95% (2021 11:55) (95% - 99%)    General: patient in no acute distress, resting comfortably  Cardio: S1/S2 +ve, regular rate and rhythm, no M/G/R  Resp: clear to ausculation bilaterally, no rales or wheezes  GI: abdomen soft, nontender, non distended, no guarding, BS +ve x 4  Neuro: unresponsive, recently received sedation  Skin: No rashes or lesions    LABS:                        14.6   15.28 )-----------( 195      ( 2021 00:44 )             43.9     -    140  |  106  |  18  ----------------------------<  207<H>  4.1   |  28  |  1.35<H>    Ca    9.1      2021 00:44    TPro  7.9  /  Alb  3.8  /  TBili  0.8  /  DBili  x   /  AST  16  /  ALT  38  /  AlkPhos  79  11-29    PT/INR - ( 2021 00:44 )   PT: 13.8 sec;   INR: 1.20 ratio         PTT - ( 2021 00:44 )  PTT:33.9 sec      Urinalysis Basic - ( 2021 03:26 )    Color: Yellow / Appearance: Clear / S.020 / pH: x  Gluc: x / Ketone: Negative  / Bili: Negative / Urobili: Negative mg/dL   Blood: x / Protein: 15 mg/dL / Nitrite: Negative   Leuk Esterase: Negative / RBC: 3-5 /HPF / WBC 6-10   Sq Epi: x / Non Sq Epi: Occasional / Bacteria: Moderate        CAPILLARY BLOOD GLUCOSE            RADIOLOGY & ADDITIONAL TESTS:  Results Reviewed:   Imaging Personally Reviewed:  Electrocardiogram Personally Reviewed:

## 2021-11-29 NOTE — PROGRESS NOTE ADULT - PROBLEM SELECTOR PLAN 8
VTE prop: heparin sc q12 hrs  - Dispo- Pending clinical improvement. Palliative c/s placed. F/u PT recs. VTE prop: heparin sc q12 hrs  - Dispo- Pending clinical improvement. Palliative c/s placed. F/u PT recs.    Updated patient's wife, Natasha at bedside on 11/29.

## 2021-11-29 NOTE — BH CONSULTATION LIAISON ASSESSMENT NOTE - MSE UNSTRUCTURED FT
+ passive eye contact, does not engage verbally but reacts to his name. He indicates to staff he needs ot use the restroom. Two person assist - Pt with difficulty sitting/standing up; short hesitant steps; decreased stride/pace; minimal arm swing. Stares at Writer and does not respond to direction/commands.

## 2021-11-29 NOTE — BH CONSULTATION LIAISON ASSESSMENT NOTE - CURRENT MEDICATION
MEDICATIONS  (STANDING):  atorvastatin 10 milliGRAM(s) Oral at bedtime  cefTRIAXone   IVPB 1000 milliGRAM(s) IV Intermittent every 24 hours  citalopram 20 milliGRAM(s) Oral daily  heparin   Injectable 5000 Unit(s) SubCutaneous every 12 hours  memantine 10 milliGRAM(s) Oral daily  metoprolol tartrate 25 milliGRAM(s) Oral two times a day  QUEtiapine 50 milliGRAM(s) Oral at bedtime  tamsulosin 0.4 milliGRAM(s) Oral at bedtime    MEDICATIONS  (PRN):

## 2021-11-29 NOTE — ED ADULT NURSE NOTE - OBJECTIVE STATEMENT
Patient received at bed 13. Patient in no acute distress. Patient has history of advanced alzheimers with violent outbursts. Patient unable to answer questions and state what happened. As per EMS, patient wife was unable to get him out of bed and arouse him. Patient having difficulty urinating and walking. Patient is in no acute distress, patient having labored breathing- placed on NC 2L with releif. Patient does not have distended abdomen, no vomiting or diarrhea present. Vitals are within normal limits/.

## 2021-11-29 NOTE — H&P ADULT - NSHPPHYSICALEXAM_GEN_ALL_CORE
Physical exam:  General: patient in no acute distress, resting comfortably  Head:  Atraumatic, Normocephalic  Eyes: EOMI, PERRLA, clear sclera  Neck: Supple, thyroid nontender, non enlarged  Cardio: S1/S2 +ve, regular rate and rhythm, no M/G/R  Resp: clear to ausculation bilaterally, no rales or wheezes  GI: abdomen soft, nontender, non distended, no guarding, BS +ve x 4  Ext: no significant pedal edema  Neuro: unresponsive, recently received sedation  Skin: No rashes or lesions

## 2021-11-29 NOTE — SWALLOW BEDSIDE ASSESSMENT ADULT - SWALLOW EVAL: DIAGNOSIS
pt presented with some lethargy which may have impacted performance however oropharyngeal dysphagia marked by decreased cognition, fair oral opening, slow AP transport posterior- munching pattern, suspect delay in swallow trigger with decreased laryngeal elevation. no overt signs of aspiration with consistencies trialed

## 2021-11-29 NOTE — BH CONSULTATION LIAISON ASSESSMENT NOTE - NSBHCONSULTRECOMMENDOTHER_PSY_A_CORE FT
continue Celexa 20mg PO qd (at max daily dose in this age group); Seroquel 50mg PO qd + add on 12.5mg PO q6hrs PRN for mild agitation; Namenda can cause weakness, fatigue, somnolence so should be taken at bedtime   - severe agitation Ativan 2mg IVP q6hrs PRN

## 2021-11-29 NOTE — H&P ADULT - PROBLEM SELECTOR PLAN 1
Patient currently not at baseline as per wife.  Recently received sedation due to severe agitation  Monitor mental status  Leukocytosis and elevated lactic acid levels  received ceftriaxone in ED, will continue  Patient currently hypoxic, requiring oxygen.  Will send for CTA

## 2021-11-29 NOTE — ED PROVIDER NOTE - CLINICAL SUMMARY MEDICAL DECISION MAKING FREE TEXT BOX
81 yo male presents w/ fever, tachycardic tachypnea. Additional HPI per family, sepsis workup, give fluids, empiric IV antibiotics, and antipyretics. 81yo M w/ PMH Alzheimer's pw fever, tachycardia, tachypnea. Plan: Code Sepsis, obtain additional HPI per family, give fluids, empiric IV antibiotics, and antipyretics. Re-eval.

## 2021-11-29 NOTE — ED PROVIDER NOTE - PHYSICAL EXAMINATION
GEN: Awake, alert, NAD.  HEAD AND NECK: NC/AT. Airway patent. Neck supple.   EYES:  Clear b/l. EOMI. PERRL.   ENT: Moist mucus membranes.   CARDIAC: Regular rate, regular rhythm. No evident pedal edema.    RESP/CHEST: Normal respiratory effort with no use of accessory muscles or retractions. Clear throughout on auscultation.  ABD: Soft, non-distended, non-tender. No rebound, no guarding.   BACK: No midline spinal TTP. No CVAT.   EXTREMITIES: Moving all extremities with no apparent deformities.   SKIN: Warm, dry, intact normal color. No rash.   NEURO: CN II-XII grossly intact, no focal deficits.

## 2021-11-29 NOTE — ED ADULT NURSE REASSESSMENT NOTE - NS ED NURSE REASSESS COMMENT FT1
1st attempt to call for report.  KAILA Rodriguez in the middle of med pass, Divia will call back later.

## 2021-11-29 NOTE — BH CONSULTATION LIAISON ASSESSMENT NOTE - NSSUICPROTFACT_PSY_ALL_CORE
Supportive social network of family or friends/Cultural, spiritual and/or moral attitudes against suicide/Positive therapeutic relationships/Jainism beliefs

## 2021-11-29 NOTE — PROGRESS NOTE ADULT - PROBLEM SELECTOR PLAN 1
Patient currently not at baseline as per wife.  Recently received sedation due to severe agitation  Monitor mental status  Leukocytosis and elevated lactic acid levels  received ceftriaxone in ED, will continue  Patient currently hypoxic, requiring oxygen.   - U/A- positive. Will empirically start IV CTX D2/3  - F/u CTA results

## 2021-11-30 ENCOUNTER — TRANSCRIPTION ENCOUNTER (OUTPATIENT)
Age: 81
End: 2021-11-30

## 2021-11-30 DIAGNOSIS — R53.81 OTHER MALAISE: ICD-10-CM

## 2021-11-30 DIAGNOSIS — R13.10 DYSPHAGIA, UNSPECIFIED: ICD-10-CM

## 2021-11-30 DIAGNOSIS — F03.90 UNSPECIFIED DEMENTIA WITHOUT BEHAVIORAL DISTURBANCE: ICD-10-CM

## 2021-11-30 DIAGNOSIS — Z51.5 ENCOUNTER FOR PALLIATIVE CARE: ICD-10-CM

## 2021-11-30 LAB
ALBUMIN SERPL ELPH-MCNC: 3.2 G/DL — LOW (ref 3.3–5)
ALP SERPL-CCNC: 60 U/L — SIGNIFICANT CHANGE UP (ref 40–120)
ALT FLD-CCNC: 28 U/L — SIGNIFICANT CHANGE UP (ref 12–78)
ANION GAP SERPL CALC-SCNC: 6 MMOL/L — SIGNIFICANT CHANGE UP (ref 5–17)
AST SERPL-CCNC: 26 U/L — SIGNIFICANT CHANGE UP (ref 15–37)
BILIRUB SERPL-MCNC: 1.1 MG/DL — SIGNIFICANT CHANGE UP (ref 0.2–1.2)
BUN SERPL-MCNC: 14 MG/DL — SIGNIFICANT CHANGE UP (ref 7–23)
CALCIUM SERPL-MCNC: 8.6 MG/DL — SIGNIFICANT CHANGE UP (ref 8.5–10.1)
CHLORIDE SERPL-SCNC: 105 MMOL/L — SIGNIFICANT CHANGE UP (ref 96–108)
CO2 SERPL-SCNC: 25 MMOL/L — SIGNIFICANT CHANGE UP (ref 22–31)
COVID-19 NUCLEOCAPSID GAM AB INTERP: NEGATIVE — SIGNIFICANT CHANGE UP
COVID-19 NUCLEOCAPSID TOTAL GAM ANTIBODY RESULT: 0.07 INDEX — SIGNIFICANT CHANGE UP
COVID-19 SPIKE DOMAIN AB INTERP: POSITIVE
COVID-19 SPIKE DOMAIN ANTIBODY RESULT: >250 U/ML — HIGH
CREAT SERPL-MCNC: 1.04 MG/DL — SIGNIFICANT CHANGE UP (ref 0.5–1.3)
CULTURE RESULTS: NO GROWTH — SIGNIFICANT CHANGE UP
GLUCOSE SERPL-MCNC: 122 MG/DL — HIGH (ref 70–99)
HCT VFR BLD CALC: 39.6 % — SIGNIFICANT CHANGE UP (ref 39–50)
HGB BLD-MCNC: 13.3 G/DL — SIGNIFICANT CHANGE UP (ref 13–17)
MCHC RBC-ENTMCNC: 29.4 PG — SIGNIFICANT CHANGE UP (ref 27–34)
MCHC RBC-ENTMCNC: 33.6 GM/DL — SIGNIFICANT CHANGE UP (ref 32–36)
MCV RBC AUTO: 87.6 FL — SIGNIFICANT CHANGE UP (ref 80–100)
NRBC # BLD: 0 /100 WBCS — SIGNIFICANT CHANGE UP (ref 0–0)
PLATELET # BLD AUTO: 176 K/UL — SIGNIFICANT CHANGE UP (ref 150–400)
POTASSIUM SERPL-MCNC: 3.7 MMOL/L — SIGNIFICANT CHANGE UP (ref 3.5–5.3)
POTASSIUM SERPL-SCNC: 3.7 MMOL/L — SIGNIFICANT CHANGE UP (ref 3.5–5.3)
PROT SERPL-MCNC: 7.3 GM/DL — SIGNIFICANT CHANGE UP (ref 6–8.3)
RBC # BLD: 4.52 M/UL — SIGNIFICANT CHANGE UP (ref 4.2–5.8)
RBC # FLD: 12.6 % — SIGNIFICANT CHANGE UP (ref 10.3–14.5)
SARS-COV-2 IGG+IGM SERPL QL IA: 0.07 INDEX — SIGNIFICANT CHANGE UP
SARS-COV-2 IGG+IGM SERPL QL IA: >250 U/ML — HIGH
SARS-COV-2 IGG+IGM SERPL QL IA: NEGATIVE — SIGNIFICANT CHANGE UP
SARS-COV-2 IGG+IGM SERPL QL IA: POSITIVE
SODIUM SERPL-SCNC: 136 MMOL/L — SIGNIFICANT CHANGE UP (ref 135–145)
SPECIMEN SOURCE: SIGNIFICANT CHANGE UP
WBC # BLD: 8.57 K/UL — SIGNIFICANT CHANGE UP (ref 3.8–10.5)
WBC # FLD AUTO: 8.57 K/UL — SIGNIFICANT CHANGE UP (ref 3.8–10.5)

## 2021-11-30 PROCEDURE — 99232 SBSQ HOSP IP/OBS MODERATE 35: CPT

## 2021-11-30 PROCEDURE — 99497 ADVNCD CARE PLAN 30 MIN: CPT | Mod: 25

## 2021-11-30 PROCEDURE — 99223 1ST HOSP IP/OBS HIGH 75: CPT

## 2021-11-30 RX ORDER — QUETIAPINE FUMARATE 200 MG/1
100 TABLET, FILM COATED ORAL AT BEDTIME
Refills: 0 | Status: DISCONTINUED | OUTPATIENT
Start: 2021-11-30 | End: 2021-12-04

## 2021-11-30 RX ADMIN — Medication 2 MILLIGRAM(S): at 15:30

## 2021-11-30 RX ADMIN — CEFTRIAXONE 100 MILLIGRAM(S): 500 INJECTION, POWDER, FOR SOLUTION INTRAMUSCULAR; INTRAVENOUS at 22:13

## 2021-11-30 RX ADMIN — Medication 2 MILLIGRAM(S): at 08:44

## 2021-11-30 RX ADMIN — CITALOPRAM 20 MILLIGRAM(S): 10 TABLET, FILM COATED ORAL at 11:29

## 2021-11-30 RX ADMIN — Medication 2 MILLIGRAM(S): at 22:21

## 2021-11-30 NOTE — HOSPICE CARE NOTE - CONVESATION DETAILS
Patient not accepted into hospice at this time per [facility] Physician.  There is no evidence that patient is within last 6 months of life at present, and has no comorbidities to warrant.   I will speak with patient's wife tomorrow and advise.  ABILIO Carr RN

## 2021-11-30 NOTE — CONSULT NOTE ADULT - PROBLEM SELECTOR RECOMMENDATION 7
met with wife Natasha and daughter Beverly at bedside, [facility] discussion as above. Family in caregiver burnout/distress situation. The goal is to keep pt home and not place in facility. He is a former  and has periods when his flashbacks and dementia lead to violent outbursts, medications currently being adjusted to assist with these symptoms. Wife Natasha had worked with dementia pts prior to her 's diagnosis but is becoming frayed and needs more support than her children who have their jobs and own kids to look after. Hospice at home is one option that we discussed and will have hospice team evaluate to see if pt qualifies. If not other resources for home care support are being discussed with Social work and family. Psychiatry input greatly appreciated. Will continue to follow for symptoms and support.   d/w Dr. Ayala

## 2021-11-30 NOTE — PROGRESS NOTE ADULT - PROBLEM SELECTOR PLAN 1
Patient currently not at baseline as per wife.  Recently received sedation due to severe agitation  Monitor mental status  Leukocytosis and elevated lactic acid levels  received ceftriaxone in ED, will continue  Patient currently hypoxic, requiring oxygen.   - U/A- positive. Will empirically start IV CTX D3/3  - F/u CTA results- No definite evidence of acute pulmonary embolism within the limits of the study. Left basilar atelectasis versus pneumonia.

## 2021-11-30 NOTE — PROGRESS NOTE ADULT - PROBLEM SELECTOR PLAN 8
VTE prop: heparin sc q12 hrs  - Dispo- Pending clinical improvement. Home hospice referral requested.    DNR/DNI    Updated patient's wife, Natasha at bedside on 11/30.

## 2021-11-30 NOTE — CONSULT NOTE ADULT - SUBJECTIVE AND OBJECTIVE BOX
HPI:  Patient is an 80M with a PMH of advanced dementia, HTN, HLD, BPH  who presents to the ED for AMS.  Patient mostly nonverbal, currently unarousable due to recently receiving medication for sedation.  Patient unable to provide history.  Wife - Natasha - at the bedside.  Patient reportedly has had increased lethargy today, is not following his normal routine at home.  Patient typically able to walk short distances inside his home but now is unable to get up to use the bathroom.  Wife noted yesterday evening that patient had subjective chills, shakes, and was diaphoretic.  Brought patient to the ED for evaluation.  Wife notes that patient has severe dementia with frequent violent outbursts at night (patient was a .)  No other complaints.  Labs show leukocytosis and lactic acidosis.  Currently SpO2 94% on 4L via NC.  Will admit to med surg.       (2021 05:47)    PERTINENT PM/SXH:   Alzheimer disease        FAMILY HISTORY:  FH: HTN (hypertension)      ITEMS NOT CHECKED ARE NOT PRESENT    SOCIAL HISTORY:   Significant other/partner[x ]  Children[ x]  Spiritism/Spirituality: Rastafari   Substance hx:  [ ]   Tobacco hx:  [ ]   Alcohol hx: [ ]   Home Opioid hx:  [ ] I-Stop Reference No:[id number]  Living Situation: [x ]Home  [ ]Long term care  [ ]Rehab [ ]Other    ADVANCE DIRECTIVES:    DNR  MOLST  [ ]  Living Will  [ x]   DECISION MAKER(s):  [x ] Health Care Proxy(s)  [ ] Surrogate(s)  [ ] Guardian           Name(s): Phone Number(s): wife Natasha     BASELINE (I)ADL(s) (prior to admission):  [place]: [ ]Total  [ ] Moderate [ x]Dependent    Allergies    No Known Allergies    Intolerances    MEDICATIONS  (STANDING):  atorvastatin 10 milliGRAM(s) Oral at bedtime  cefTRIAXone   IVPB 1000 milliGRAM(s) IV Intermittent every 24 hours  citalopram 20 milliGRAM(s) Oral daily  heparin   Injectable 5000 Unit(s) SubCutaneous every 12 hours  LORazepam   Injectable 1 milliGRAM(s) IV Push once  memantine 10 milliGRAM(s) Oral at bedtime  metoprolol tartrate 25 milliGRAM(s) Oral two times a day  QUEtiapine 50 milliGRAM(s) Oral at bedtime  tamsulosin 0.4 milliGRAM(s) Oral at bedtime    MEDICATIONS  (PRN):  LORazepam   Injectable 2 milliGRAM(s) IV Push every 6 hours PRN severe agitation / combativeness  QUEtiapine 12.5 milliGRAM(s) Oral every 6 hours PRN mild agitation    PRESENT SYMPTOMS: [x ]Unable to obtain due to poor mentation   Source if other than patient:  [ ]Family   [ ]Team     Pain: [ ]yes [ ]no  QOL impact -   Location -                    Aggravating factors -  Quality -  Radiation -  Timing-  Severity (0-10 scale):  Minimal acceptable level (0-10 scale):     CPOT:    https://www.[facility].org/getattachment/[email or web address]/Critical-Care-Pain-Observation-Tool-(CPOT)      PAIN AD Score:     http://geriatrictoolkit.[facility]/cog/painad.pdf (press ctrl +  left click to view)    Dyspnea:                           [ ]Mild [ ]Moderate [ ]Severe  Anxiety:                             [ ]Mild [ ]Moderate [ ]Severe  Fatigue:                             [ ]Mild [ ]Moderate [ ]Severe  Nausea:                             [ ]Mild [ ]Moderate [ ]Severe  Loss of appetite:              [ ]Mild [ ]Moderate [ ]Severe  Constipation:                    [ ]Mild [ ]Moderate [ ]Severe    Other Symptoms:  [ ]All other review of systems negative     Palliative Performance Status Version 2:         %    http://npcrc.org/files/news/palliative_performance_scale_ppsv2.pdf  PHYSICAL EXAM:  Vital Signs Last 24 Hrs  T(C): 37.2 (2021 12:28), Max: 37.2 (2021 12:28)  T(F): 99 (2021 12:28), Max: 99 (2021 12:28)  HR: 78 (2021 12:28) (78 - 96)  BP: 137/72 (2021 12:28) (120/68 - 141/69)  BP(mean): --  RR: 18 (2021 12:28) (18 - 19)  SpO2: 93% (2021 12:28) (93% - 96%) I&O's Summary    GENERAL:  [ ]Alert  [ ]Oriented x   [ ]Lethargic  [ ]Cachexia  [ ]Unarousable  [ ]Verbal  [ ]Non-Verbal  Behavioral:   [ ] Anxiety  [ ] Delirium [ ] Agitation [ ] Other  HEENT:  [ ]Normal   [ ]Dry mouth   [ ]ET Tube/Trach  [ ]Oral lesions  PULMONARY:   [ ]Clear [ ]Tachypnea  [ ]Audible excessive secretions   [ ]Rhonchi        [ ]Right [ ]Left [ ]Bilateral  [ ]Crackles        [ ]Right [ ]Left [ ]Bilateral  [ ]Wheezing     [ ]Right [ ]Left [ ]Bilateral  [ ]Diminished breath sounds [ ]right [ ]left [ ]bilateral  CARDIOVASCULAR:    [ ]Regular [ ]Irregular [ ]Tachy  [ ]Hilario [ ]Murmur [ ]Other  GASTROINTESTINAL:  [ ]Soft  [ ]Distended   [ ]+BS  [ ]Non tender [ ]Tender  [ ]PEG [ ]OGT/ NGT  Last BM:   GENITOURINARY:  [ ]Normal [ ] Incontinent   [ ]Oliguria/Anuria   [ ]Morin  MUSCULOSKELETAL:   [ ]Normal   [ ]Weakness  [ ]Bed/Wheelchair bound [ ]Edema  NEUROLOGIC:   [ ]No focal deficits  [ ]Cognitive impairment  [ ]Dysphagia [ ]Dysarthria [ ]Paresis [ ]Other   SKIN:   [ ]Normal    [ ]Rash  [ ]Pressure ulcer(s)       Present on admission [ ]y [ ]n    CRITICAL CARE:  [ ] Shock Present  [ ]Septic [ ]Cardiogenic [ ]Neurologic [ ]Hypovolemic  [ ]  Vasopressors [ ]  Inotropes   [ ]Respiratory failure present [ ]Mechanical ventilation [ ]Non-invasive ventilatory support [ ]High flow    [ ]Acute  [ ]Chronic [ ]Hypoxic  [ ]Hypercarbic [ ]Other  [ ]Other organ failure     LABS:                        13.3   8.57  )-----------( 176      ( 2021 07:44 )             39.6   11    136  |  105  |  14  ----------------------------<  122<H>  3.7   |  25  |  1.04    Ca    8.6      2021 07:44    TPro  7.3  /  Alb  3.2<L>  /  TBili  1.1  /  DBili  x   /  AST  26  /  ALT  28  /  AlkPhos  60  11-30  PT/INR - ( 2021 00:44 )   PT: 13.8 sec;   INR: 1.20 ratio         PTT - ( 2021 00:44 )  PTT:33.9 sec    Urinalysis Basic - ( 2021 03:26 )    Color: Yellow / Appearance: Clear / S.020 / pH: x  Gluc: x / Ketone: Negative  / Bili: Negative / Urobili: Negative mg/dL   Blood: x / Protein: 15 mg/dL / Nitrite: Negative   Leuk Esterase: Negative / RBC: 3-5 /HPF / WBC 6-10   Sq Epi: x / Non Sq Epi: Occasional / Bacteria: Moderate      RADIOLOGY & ADDITIONAL STUDIES:    PROTEIN CALORIE MALNUTRITION PRESENT: [ ]mild [ ]moderate [ ]severe [ ]underweight [ ]morbid obesity  https://www.andeal.org/vault/2440/web/files/ONC/Table_Clinical%20Characteristics%20to%20Document%20Malnutrition-White%20JV%20et%20al%2020.pdf    Height (cm): 175.3 (21 @ 23:37), 175.3 (21 @ 10:45)  Weight (kg): 100.1 (21 @ 06:50), 86.2 (21 @ 10:45)  BMI (kg/m2): 32.6 (21 @ 06:50), 28.1 (21 @ 23:37), 28.1 (21 @ 10:45)    [ ]PPSV2 < or = to 30% [ ]significant weight loss  [ ]poor nutritional intake  [ ]anasarca      [ ]Artificial Nutrition      REFERRALS:   [ ]Chaplaincy  [ ]Hospice  [ ]Child Life  [ ]Social Work  [ ]Case management [ ]Holistic Therapy     Goals of Care Document:  HPI:  Patient is an 80M with a PMH of advanced dementia, HTN, HLD, BPH  who presents to the ED for AMS.  Patient mostly nonverbal, currently unarousable due to recently receiving medication for sedation.  Patient unable to provide history.  Wife - Natasha - at the bedside.  Patient reportedly has had increased lethargy today, is not following his normal routine at home.  Patient typically able to walk short distances inside his home but now is unable to get up to use the bathroom.  Wife noted yesterday evening that patient had subjective chills, shakes, and was diaphoretic.  Brought patient to the ED for evaluation.  Wife notes that patient has severe dementia with frequent violent outbursts at night (patient was a .)  No other complaints.  Labs show leukocytosis and lactic acidosis.  Currently SpO2 94% on 4L via NC.  Will admit to med surg.       (2021 05:47)    PERTINENT PM/SXH:   Alzheimer disease        FAMILY HISTORY:  FH: HTN (hypertension)      ITEMS NOT CHECKED ARE NOT PRESENT    SOCIAL HISTORY:   Significant other/partner[x ]  Children[ x]  Zoroastrianism/Spirituality: Synagogue   Substance hx:  [ ]   Tobacco hx:  [ ]   Alcohol hx: [ ]   Home Opioid hx:  [ ] I-Stop Reference No:[id number]  Living Situation: [x ]Home  [ ]Long term care  [ ]Rehab [ ]Other    ADVANCE DIRECTIVES:    DNR  MOLST  [ ]  Living Will  [ x]   DECISION MAKER(s):  [x ] Health Care Proxy(s)  [ ] Surrogate(s)  [ ] Guardian           Name(s): Phone Number(s): wife Natasha     BASELINE (I)ADL(s) (prior to admission):  [place]: [ ]Total  [ ] Moderate [ x]Dependent    Allergies    No Known Allergies    Intolerances    MEDICATIONS  (STANDING):  atorvastatin 10 milliGRAM(s) Oral at bedtime  cefTRIAXone   IVPB 1000 milliGRAM(s) IV Intermittent every 24 hours  citalopram 20 milliGRAM(s) Oral daily  heparin   Injectable 5000 Unit(s) SubCutaneous every 12 hours  LORazepam   Injectable 1 milliGRAM(s) IV Push once  memantine 10 milliGRAM(s) Oral at bedtime  metoprolol tartrate 25 milliGRAM(s) Oral two times a day  QUEtiapine 50 milliGRAM(s) Oral at bedtime  tamsulosin 0.4 milliGRAM(s) Oral at bedtime    MEDICATIONS  (PRN):  LORazepam   Injectable 2 milliGRAM(s) IV Push every 6 hours PRN severe agitation / combativeness  QUEtiapine 12.5 milliGRAM(s) Oral every 6 hours PRN mild agitation    PRESENT SYMPTOMS: [x ]Unable to obtain due to poor mentation   Source if other than patient:  [ ]Family   [ ]Team     Pain: [ ]yes [ ]no  QOL impact -   Location -                    Aggravating factors -  Quality -  Radiation -  Timing-  Severity (0-10 scale):  Minimal acceptable level (0-10 scale):     CPOT:    https://www.[facility].org/getattachment/[email or web address]/Critical-Care-Pain-Observation-Tool-(CPOT)      PAIN AD Score:     http://geriatrictoolkit.[facility]/cog/painad.pdf (press ctrl +  left click to view)    Dyspnea:                           [ ]Mild [ ]Moderate [ ]Severe  Anxiety:                             [ ]Mild [ ]Moderate [ ]Severe  Fatigue:                             [ ]Mild [ ]Moderate [ ]Severe  Nausea:                             [ ]Mild [ ]Moderate [ ]Severe  Loss of appetite:              [ ]Mild [ ]Moderate [ ]Severe  Constipation:                    [ ]Mild [ ]Moderate [ ]Severe    Other Symptoms:  [ ]All other review of systems negative     Palliative Performance Status Version 2:       40 %    http://[facility].org/files/news/palliative_performance_scale_ppsv2.pdf  PHYSICAL EXAM:  Vital Signs Last 24 Hrs  T(C): 37.2 (2021 12:28), Max: 37.2 (2021 12:28)  T(F): 99 (2021 12:28), Max: 99 (2021 12:28)  HR: 78 (2021 12:28) (78 - 96)  BP: 137/72 (2021 12:28) (120/68 - 141/69)  BP(mean): --  RR: 18 (2021 12:28) (18 - 19)  SpO2: 93% (2021 12:28) (93% - 96%) I&O's Summary    GENERAL:  [ ]Alert  [ x]Oriented x1  [ x]Lethargic  [ ]Cachexia  [ ]Unarousable  [ ]Verbal  [ x]Non-Verbal- incoherent speech   Behavioral:   [ ] Anxiety  [x ] Delirium [ x] Agitation [ ] Other  HEENT:  [ ]Normal   [x ]Dry mouth   [ ]ET Tube/Trach  [ ]Oral lesions  PULMONARY:   [ x]Clear [ x]Tachypnea  [ ]Audible excessive secretions   [ ]Rhonchi        [ ]Right [ ]Left [ ]Bilateral  [ ]Crackles        [ ]Right [ ]Left [ ]Bilateral  [ ]Wheezing     [ ]Right [ ]Left [ ]Bilateral  [ ]Diminished breath sounds [ ]right [ ]left [ ]bilateral  CARDIOVASCULAR:    [x ]Regular [ ]Irregular [ ]Tachy  [ ]Hilario [ ]Murmur [ ]Other  GASTROINTESTINAL:  [x ]Soft  [ ]Distended   [ x]+BS  [ ]Non tender [ ]Tender  [ ]PEG [ ]OGT/ NGT  Last BM:   GENITOURINARY:  [ ]Normal [x ] Incontinent   [ ]Oliguria/Anuria   [ ]Morin  MUSCULOSKELETAL:   [ ]Normal   [x ]Weakness  [ ]Bed/Wheelchair bound [ ]Edema  NEUROLOGIC:   [ ]No focal deficits  [ x]Cognitive impairment  [ x]Dysphagia [x ]Dysarthria [ ]Paresis [ ]Other   SKIN:   [x ]Normal    [ ]Rash  [ ]Pressure ulcer(s)       Present on admission [ ]y [ ]n    CRITICAL CARE:  [ ] Shock Present  [ ]Septic [ ]Cardiogenic [ ]Neurologic [ ]Hypovolemic  [ ]  Vasopressors [ ]  Inotropes   [ ]Respiratory failure present [ ]Mechanical ventilation [ ]Non-invasive ventilatory support [ ]High flow    [ ]Acute  [ ]Chronic [ ]Hypoxic  [ ]Hypercarbic [ ]Other  [ ]Other organ failure     LABS:                        13.3   8.57  )-----------( 176      ( 2021 07:44 )             39.6   11-30    136  |  105  |  14  ----------------------------<  122<H>  3.7   |  25  |  1.04    Ca    8.6      2021 07:44    TPro  7.3  /  Alb  3.2<L>  /  TBili  1.1  /  DBili  x   /  AST  26  /  ALT  28  /  AlkPhos  60    PT/INR - ( 2021 00:44 )   PT: 13.8 sec;   INR: 1.20 ratio         PTT - ( 2021 00:44 )  PTT:33.9 sec    Urinalysis Basic - ( 2021 03:26 )    Color: Yellow / Appearance: Clear / S.020 / pH: x  Gluc: x / Ketone: Negative  / Bili: Negative / Urobili: Negative mg/dL   Blood: x / Protein: 15 mg/dL / Nitrite: Negative   Leuk Esterase: Negative / RBC: 3-5 /HPF / WBC 6-10   Sq Epi: x / Non Sq Epi: Occasional / Bacteria: Moderate      RADIOLOGY & ADDITIONAL STUDIES:   < from: CT Angio Chest PE Protocol w/ IV Cont (21 @ 12:53) >    EXAM:  CT ANGIO CHEST PULM ART [facility]                            PROCEDURE DATE:  2021          INTERPRETATION:  CLINICAL INFORMATION: Rule out pulmonary embolus    COMPARISON: None.    CONTRAST/COMPLICATIONS:  IV Contrast: Omnipaque 350  90 cc administered   10 cc discarded  Oral Contrast: NONE  Complications: None reported at time of study completion    PROCEDURE:  CT Angiography of the Chest.  Sagittal and coronal reformats were performed as well as 3D (MIP) reconstructions.    FINDINGS:    LUNGS AND AIRWAYS: Patent central airways.  Left basilar atelectasis versus pneumonia.  PLEURA: No pleural effusion.  MEDIASTINUM AND JAQUELINE: No lymphadenopathy.  VESSELS: Study is mildly limited by motion artifact particularly for evaluation of the segmental and subsegmental pulmonary arteries. No definite evidence of acute pulmonary embolism within the limits of the study.. Atherosclerotic changes of the aorta and coronary vasculature.  HEART: Heart size is normal. No pericardial effusion.  CHEST WALL AND LOWER NECK: Within normal limits.  VISUALIZED UPPER ABDOMEN: Left lateral midpole renal cyst partially visualized. Hepatic steatosis.  BONES: Degenerative changes.    IMPRESSION:  Study is mildly limited by motion artifact particularly for evaluation of the segmental and subsegmental pulmonary arteries. No definite evidence of acute pulmonary embolism within the limits of the study    Left basilar atelectasis versus pneumonia.    --- End of Report ---            PUMA GONZALEZ MD; Attending Radiologist  This document has been electronically signed. 2021  2:07PM    < end of copied text >    < from: Xray Chest 1 View- PORTABLE-Urgent (21 @ 00:40) >    EXAM:  XR CHEST PORTABLE URGENT 1V                            PROCEDURE DATE:  2021          INTERPRETATION:  Portable chest radiograph    CLINICAL INFORMATION: Sepsis    TECHNIQUE:  Portable  AP view of the chest.    COMPARISON: No previous examinations are available for review.    FINDINGS:    The visualized lungs are clear of airspace consolidations or effusions.   No pneumothorax.    The heart and mediastinum size and configuration are within normal limits.    Visualized osseous structures are intact.    IMPRESSION:   No radiographic evidence of active chest disease.    --- End of Report ---            DAVID ISIDRO MD; Attending Radiologist  This document has been electronically signed. 2021  8:27AM    < end of copied text >  PROTEIN CALORIE MALNUTRITION PRESENT: [x ]mild [ ]moderate [ ]severe [ ]underweight [ ]morbid obesity  https://www.andeal.org/vault/2440/web/files/ONC/Table_Clinical%20Characteristics%20to%20Document%20Malnutrition-White%20JV%20et%20al%2020.pdf    Height (cm): 175.3 (21 @ 23:37), 175.3 (21 @ 10:45)  Weight (kg): 100.1 (21 @ 06:50), 86.2 (21 @ 10:45)  BMI (kg/m2): 32.6 (21 @ 06:50), 28.1 (21 @ 23:37), 28.1 (21 @ 10:45)    [ ]PPSV2 < or = to 30% [ ]significant weight loss  [ ]poor nutritional intake  [ ]anasarca      [ ]Artificial Nutrition      REFERRALS:   [ ]Chaplaincy  [x ]Hospice  [ ]Child Life  [ x]Social Work  [ x]Case management [ ]Holistic Therapy     Goals of Care Document:

## 2021-11-30 NOTE — DISCHARGE NOTE PROVIDER - HOSPITAL COURSE
trish is an 80M with a PMH of advanced dementia, HTN, HLD, BPH  who presents to the ED for AMS.  Patient mostly nonverbal, currently unarousable due to recently receiving medication for sedation.  Patient unable to provide history.  Wife - Natasha - at the bedside.  Patient reportedly has had increased lethargy today, is not following his normal routine at home.  Patient typically able to walk short distances inside his home but now is unable to get up to use the bathroom.  Wife noted yesterday evening that patient had subjective chills, shakes, and was diaphoretic.  Brought patient to the ED for evaluation.  Wife notes that patient has severe dementia with frequent violent outbursts at night (patient was a .)  No other complaints.  Labs show leukocytosis and lactic acidosis. SpO2 94% on 4L via NC. Patient was admitted for AMS. Psych was consulted.  Psych meds were started. Palliative care was started due to end stage dementia with behavior disturbance. Per patient's family member, they request hospice care. Hospice care is initated...... CT angio of the chest shows No definite evidence of acute pulmonary embolism within the limits of the study. CXR shows The visualized lungs are clear of airspace consolidations or effusions; No pneumothorax. UA was positive.  Patient was started on abx. Today, patient is at his baseline per family.  Labs shows a decrease in WBC. Patient has no acute event overnight.   Patient is clear for discharge. Patient should FU PCP in 2 weeks. Patient should continue his home medication and prescribed medication.  80M with a PMH of advanced dementia, HTN, HLD, BPH  who presents to the ED for AMS.  Patient mostly nonverbal, currently unarousable due to recently receiving medication for sedation.  Patient unable to provide history.  Wife - Natasha - at the bedside.  Patient reportedly has had increased lethargy today, is not following his normal routine at home.  Patient typically able to walk short distances inside his home but now is unable to get up to use the bathroom.  Wife noted yestetrday evening that patient had subjective chills, shakes, and was diaphoretic.  Brought patient to the ED for evaluation.  Wife notes that patient has severe dementia with frequent violent outburts at night (patient was a .)  No other complaints.  Labs show leukocytosis and lactic acidosis.  Currently SpO2 94% on 4L via NC.           Problem/Plan - 1:  ·  Problem: Other specified sepsis.   ·  Plan: POA  102 temp and wbc of 15k on admission   suspect 2/2 PNA which was seen on cta   s/p 5 days of antibiotics and   clinically improved  with normal wbc and remains afebrile since admission  normal o2 sats now.     Problem/Plan - 2:  ·  Problem: Metabolic encephalopathy.   ·  Plan: Patient close to baseline as per wife.     Problem/Plan - 3:  ·  Problem: Agitation due to dementia.   ·  Plan: - behavioral health c/s placed for delirium w/ advanced Alzheimer's dementia  - c/w seroquel and newly added depakote sprinkles.   pt accepted to [facility].     Problem/Plan - 4:  ·  Problem: Lactic acidosis.   ·  Plan: Lactate resolved, unknown source. possible due to dehydration.     Problem/Plan - 5:  ·  Problem: Alzheimer's dementia.   ·  Plan: continue memantine, Seroquel.     Problem/Plan - 6:  ·  Problem: Essential hypertension.   ·  Plan: metoprolol.     Problem/Plan - 7:  ·  Problem: Hyperlipidemia.   ·  Plan: lipitor.     Problem/Plan - 8:  ·  Problem: BPH without urinary obstruction.   ·  Plan: tamsulosin.

## 2021-11-30 NOTE — CONSULT NOTE ADULT - PROBLEM SELECTOR RECOMMENDATION 9
concern for infection  pt was "shaking" and "burning up" per wife  afebrile here more agitated than anything   empiric CTX given for mildly + UA  Blood and urine cx negative  CT chest atelectasis  factors include progression of dementia with agitation

## 2021-11-30 NOTE — CONSULT NOTE ADULT - PROBLEM SELECTOR RECOMMENDATION 4
diagnosed approx 11 years ago  FAST 7B  considering hospice eval although typically FAST 7C or worse would qualify  wife and children have cared for pt and do not want to place him in a facility   looking for options for support in the home   seroquel for agitation   PRN ativan for severe outbursts  continues on namenda

## 2021-11-30 NOTE — DISCHARGE NOTE PROVIDER - NSDCMRMEDTOKEN_GEN_ALL_CORE_FT
acetaminophen 500 mg oral tablet: 1 tab(s) orally 2 times a day   Lidoderm 5% topical film: Apply topically to affected area once a day    citalopram 20 mg oral tablet: 1 tab(s) orally once a day  divalproex sodium 125 mg oral delayed release capsule: 2 cap(s) orally 2 times a day  metoprolol succinate 50 mg oral tablet, extended release: 1 tab(s) orally once a day  QUEtiapine 100 mg oral tablet: 1 tab(s) orally once a day (at bedtime)

## 2021-11-30 NOTE — DISCHARGE NOTE PROVIDER - NSDCCPCAREPLAN_GEN_ALL_CORE_FT
PRINCIPAL DISCHARGE DIAGNOSIS  Diagnosis: Sepsis, unspecified organism  Assessment and Plan of Treatment:        PRINCIPAL DISCHARGE DIAGNOSIS  Diagnosis: Sepsis, unspecified organism  Assessment and Plan of Treatment: 80M with a PMH of advanced dementia, HTN, HLD, BPH  who presents to the ED for AMS.  Patient mostly nonverbal, currently unarousable due to recently receiving medication for sedation.  Patient unable to provide history.  Wife - Natasha - at the bedside.  Patient reportedly has had increased lethargy today, is not following his normal routine at home.  Patient typically able to walk short distances inside his home but now is unable to get up to use the bathroom.  Wife noted yestetrday evening that patient had subjective chills, shakes, and was diaphoretic.  Brought patient to the ED for evaluation.  Wife notes that patient has severe dementia with frequent violent outburts at night (patient was a .)  No other complaints.  Labs show leukocytosis and lactic acidosis.  Currently SpO2 94% on 4L via NC.     Problem/Plan - 1:  ·  Problem: Other specified sepsis.   ·  Plan: POA  102 temp and wbc of 15k on admission   suspect 2/2 PNA which was seen on cta   s/p 5 days of antibiotics and   clinically improved  with normal wbc and remains afebrile since admission  normal o2 sats now.   Problem/Plan - 2:  ·  Problem: Metabolic encephalopathy.   ·  Plan: Patient close to baseline as per wife.   Problem/Plan - 3:  ·  Problem: Agitation due to dementia.   ·  Plan: - behavioral health c/s placed for delirium w/ advanced Alzheimer's dementia  - c/w seroquel and newly added depakote sprinkles.   pt accepted to [facility].   Problem/Plan - 4:  ·  Problem: Lactic acidosis.   ·  Plan: Lactate resolved, unknown source. possible due to dehydration.   Problem/Plan - 5:  ·  Problem: Alzheimer's dementia.   ·  Plan: continue memantine, Seroquel.   Problem/Plan - 6:  ·  Problem: Essential hypertension.   ·  Plan: metoprolol.   Problem/Plan - 7:  ·  Problem: Hyperlipidemia.   ·  Plan: lipitor.   Problem/Plan - 8:  ·  Problem: BPH without urinary obstruction.   ·  Plan: tamsulosin.

## 2021-11-30 NOTE — CONSULT NOTE ADULT - CONVERSATION DETAILS
met with pt's wife and daughter at bedside, Natasha pt's wife has professional experience in dealing with Alzheimer dementia pt's and has been caring for her  since his diagnosis nearly 11 years ago. She stated that at the time of his diagnosis they met with an elder  and had documents drawn up including HCP and living will. She does not have them on file with the hospital. We discussed pt's wishes and the MOLST form which is used in health care settings. We went through each section and family was given explanation of each choice, MOLST completed and signed by wife. Goal is for pt to be returning home with some form of care, potentially hospice. I explained the hospice benefit to wife and daughter and they asked to speak to the hospice team, referral requested.

## 2021-11-30 NOTE — CONSULT NOTE ADULT - ASSESSMENT
80M with a PMH of advanced dementia, HTN, HLD, BPH  admitted for AMS, concern for underlying infection. Palliative Care consulted for GOC, possible hospice.

## 2021-11-30 NOTE — PROGRESS NOTE ADULT - SUBJECTIVE AND OBJECTIVE BOX
Patient is a 80y old  Male who presents with a chief complaint of AMS (2021 14:06)      INTERVAL HPI/OVERNIGHT EVENTS: Overnight, patient was restless and agitated. HD stable.     MEDICATIONS  (STANDING):  atorvastatin 10 milliGRAM(s) Oral at bedtime  cefTRIAXone   IVPB 1000 milliGRAM(s) IV Intermittent every 24 hours  citalopram 20 milliGRAM(s) Oral daily  heparin   Injectable 5000 Unit(s) SubCutaneous every 12 hours  LORazepam   Injectable 1 milliGRAM(s) IV Push once  memantine 10 milliGRAM(s) Oral at bedtime  metoprolol tartrate 25 milliGRAM(s) Oral two times a day  QUEtiapine 50 milliGRAM(s) Oral at bedtime  tamsulosin 0.4 milliGRAM(s) Oral at bedtime    MEDICATIONS  (PRN):  LORazepam   Injectable 2 milliGRAM(s) IV Push every 6 hours PRN severe agitation / combativeness  QUEtiapine 12.5 milliGRAM(s) Oral every 6 hours PRN mild agitation      Allergies    No Known Allergies    Intolerances        REVIEW OF SYSTEMS: all negative with exception of above    Vital Signs Last 24 Hrs  T(C): 37.2 (2021 12:28), Max: 37.2 (2021 12:28)  T(F): 99 (2021 12:28), Max: 99 (2021 12:28)  HR: 78 (2021 12:28) (78 - 96)  BP: 137/72 (2021 12:28) (120/68 - 141/69)  BP(mean): --  RR: 18 (2021 12:28) (18 - 19)  SpO2: 93% (2021 12:28) (93% - 96%)    PHYSICAL EXAM:  General: patient in no acute distress, resting comfortably  Cardio: S1/S2 +ve, regular rate and rhythm, no M/G/R  Resp: clear to ausculation bilaterally, no rales or wheezes  GI: abdomen soft, nontender, non distended, no guarding, BS +ve x 4  Neuro: unresponsive, recently received sedation  Skin: No rashes or lesions    LABS:                        13.3   8.57  )-----------( 176      ( 2021 07:44 )             39.6     11-30    136  |  105  |  14  ----------------------------<  122<H>  3.7   |  25  |  1.04    Ca    8.6      2021 07:44    TPro  7.3  /  Alb  3.2<L>  /  TBili  1.1  /  DBili  x   /  AST  26  /  ALT  28  /  AlkPhos  60      PT/INR - ( 2021 00:44 )   PT: 13.8 sec;   INR: 1.20 ratio         PTT - ( 2021 00:44 )  PTT:33.9 sec  Urinalysis Basic - ( 2021 03:26 )    Color: Yellow / Appearance: Clear / S.020 / pH: x  Gluc: x / Ketone: Negative  / Bili: Negative / Urobili: Negative mg/dL   Blood: x / Protein: 15 mg/dL / Nitrite: Negative   Leuk Esterase: Negative / RBC: 3-5 /HPF / WBC 6-10   Sq Epi: x / Non Sq Epi: Occasional / Bacteria: Moderate      CAPILLARY BLOOD GLUCOSE          RADIOLOGY & ADDITIONAL TESTS:    Imaging Personally Reviewed:  [ ] YES  [ ] NO    Consultant(s) Notes Reviewed:  [ ] YES  [ ] NO    Care Discussed with Consultants/Other Providers [ ] YES  [ ] NO

## 2021-12-01 PROCEDURE — 99233 SBSQ HOSP IP/OBS HIGH 50: CPT

## 2021-12-01 PROCEDURE — 99231 SBSQ HOSP IP/OBS SF/LOW 25: CPT

## 2021-12-01 PROCEDURE — 99232 SBSQ HOSP IP/OBS MODERATE 35: CPT

## 2021-12-01 RX ORDER — DIVALPROEX SODIUM 500 MG/1
250 TABLET, DELAYED RELEASE ORAL
Refills: 0 | Status: DISCONTINUED | OUTPATIENT
Start: 2021-12-01 | End: 2021-12-04

## 2021-12-01 RX ORDER — METOPROLOL TARTRATE 50 MG
50 TABLET ORAL DAILY
Refills: 0 | Status: DISCONTINUED | OUTPATIENT
Start: 2021-12-01 | End: 2021-12-04

## 2021-12-01 RX ADMIN — HEPARIN SODIUM 5000 UNIT(S): 5000 INJECTION INTRAVENOUS; SUBCUTANEOUS at 17:38

## 2021-12-01 RX ADMIN — Medication 2 MILLIGRAM(S): at 21:38

## 2021-12-01 RX ADMIN — QUETIAPINE FUMARATE 12.5 MILLIGRAM(S): 200 TABLET, FILM COATED ORAL at 13:13

## 2021-12-01 RX ADMIN — DIVALPROEX SODIUM 250 MILLIGRAM(S): 500 TABLET, DELAYED RELEASE ORAL at 17:38

## 2021-12-01 RX ADMIN — CITALOPRAM 20 MILLIGRAM(S): 10 TABLET, FILM COATED ORAL at 13:13

## 2021-12-01 RX ADMIN — Medication 2 MILLIGRAM(S): at 08:15

## 2021-12-01 RX ADMIN — QUETIAPINE FUMARATE 100 MILLIGRAM(S): 200 TABLET, FILM COATED ORAL at 21:04

## 2021-12-01 NOTE — PROGRESS NOTE ADULT - PROBLEM SELECTOR PLAN 1
Patient currently not at baseline as per wife.  Recently received sedation due to severe agitation  Monitor mental status  Leukocytosis and elevated lactic acid levels  received ceftriaxone in ED, will continue  Patient currently hypoxic, requiring oxygen.   - U/A- positive. Completed IV CTX D3/3  - F/u CTA results- No definite evidence of acute pulmonary embolism within the limits of the study. Left basilar atelectasis versus pneumonia.

## 2021-12-01 NOTE — PROGRESS NOTE ADULT - SUBJECTIVE AND OBJECTIVE BOX
Patient is a 80y old  Male who presents with a chief complaint of AMS (30 Nov 2021 15:12)    INTERVAL HPI/OVERNIGHT EVENTS: Patient was restless and agitated. HD stable.     MEDICATIONS  (STANDING):  atorvastatin 10 milliGRAM(s) Oral at bedtime  citalopram 20 milliGRAM(s) Oral daily  heparin   Injectable 5000 Unit(s) SubCutaneous every 12 hours  LORazepam   Injectable 1 milliGRAM(s) IV Push once  memantine 10 milliGRAM(s) Oral at bedtime  metoprolol tartrate 25 milliGRAM(s) Oral two times a day  QUEtiapine 100 milliGRAM(s) Oral at bedtime  tamsulosin 0.4 milliGRAM(s) Oral at bedtime    MEDICATIONS  (PRN):  LORazepam   Injectable 2 milliGRAM(s) IV Push every 6 hours PRN severe agitation / combativeness  QUEtiapine 12.5 milliGRAM(s) Oral every 6 hours PRN mild agitation      Allergies    No Known Allergies    Intolerances        REVIEW OF SYSTEMS: all negative with exception of above    Vital Signs Last 24 Hrs  T(C): 36.3 (01 Dec 2021 11:51), Max: 37.2 (30 Nov 2021 12:28)  T(F): 97.3 (01 Dec 2021 11:51), Max: 99 (30 Nov 2021 12:28)  HR: 73 (01 Dec 2021 11:51) (71 - 80)  BP: 148/83 (01 Dec 2021 11:51) (118/49 - 148/83)  BP(mean): --  RR: 18 (01 Dec 2021 11:51) (18 - 20)  SpO2: 91% (01 Dec 2021 11:51) (91% - 96%)    PHYSICAL EXAM:  General: patient in no acute distress, resting comfortably  Cardio: S1/S2 +ve, regular rate and rhythm, no M/G/R  Resp: clear to ausculation bilaterally, no rales or wheezes  GI: abdomen soft, nontender, non distended, no guarding, BS +ve x 4  Neuro: unresponsive, recently received sedation  Skin: No rashes or lesions    LABS:                        13.3   8.57  )-----------( 176      ( 30 Nov 2021 07:44 )             39.6     11-30    136  |  105  |  14  ----------------------------<  122<H>  3.7   |  25  |  1.04    Ca    8.6      30 Nov 2021 07:44    TPro  7.3  /  Alb  3.2<L>  /  TBili  1.1  /  DBili  x   /  AST  26  /  ALT  28  /  AlkPhos  60  11-30        CAPILLARY BLOOD GLUCOSE          RADIOLOGY & ADDITIONAL TESTS:    Imaging Personally Reviewed:  [ ] YES  [ ] NO    Consultant(s) Notes Reviewed:  [ ] YES  [ ] NO    Care Discussed with Consultants/Other Providers [ ] YES  [ ] NO

## 2021-12-01 NOTE — PROGRESS NOTE ADULT - SUBJECTIVE AND OBJECTIVE BOX
SUBJECTIVE AND OBJECTIVE:  INTERVAL HPI/OVERNIGHT EVENTS:    DNR on chart: yes  Allergies    No Known Allergies    Intolerances    MEDICATIONS  (STANDING):  citalopram 20 milliGRAM(s) Oral daily  diVALproex Sprinkle 250 milliGRAM(s) Oral two times a day  heparin   Injectable 5000 Unit(s) SubCutaneous every 12 hours  LORazepam   Injectable 1 milliGRAM(s) IV Push once  metoprolol succinate ER 50 milliGRAM(s) Oral daily  QUEtiapine 100 milliGRAM(s) Oral at bedtime    MEDICATIONS  (PRN):  LORazepam   Injectable 2 milliGRAM(s) IV Push every 6 hours PRN severe agitation / combativeness  QUEtiapine 12.5 milliGRAM(s) Oral every 6 hours PRN mild agitation      ITEMS UNCHECKED ARE NOT PRESENT    PRESENT SYMPTOMS: [ ]Unable to obtain due to poor mentation   Source if other than patient:  [ ]Family   [ ]Team     Pain:  [ ]yes [ ]no  QOL impact -   Location -                    Aggravating factors -  Quality -  Radiation -  Timing-  Severity (0-10 scale):  Minimal acceptable level (0-10 scale):     Dyspnea:                           [ ]Mild [ ]Moderate [ ]Severe  Anxiety:                             [ ]Mild [ ]Moderate [ ]Severe  Fatigue:                             [ ]Mild [ ]Moderate [ ]Severe  Nausea:                             [ ]Mild [ ]Moderate [ ]Severe  Loss of appetite:              [ ]Mild [ ]Moderate [ ]Severe  Constipation:                    [ ]Mild [ ]Moderate [ ]Severe    CPOT:    https://www.[facility].org/getattachment/[email or web address]/Critical-Care-Pain-Observation-Tool-(CPOT)    PAIN AD Score:	  http://geriatrictoolkit.[facility]/cog/painad.pdf (Ctrl + left click to view)    Other Symptoms:  [ ]All other review of systems negative     Palliative Performance Status Version 2:         %      http://npcrc.org/files/news/palliative_performance_scale_ppsv2.pdf  PHYSICAL EXAM:  Vital Signs Last 24 Hrs  T(C): 36.4 (01 Dec 2021 17:15), Max: 36.6 (30 Nov 2021 23:10)  T(F): 97.6 (01 Dec 2021 17:15), Max: 97.8 (30 Nov 2021 23:10)  HR: 90 (01 Dec 2021 17:15) (71 - 90)  BP: 146/84 (01 Dec 2021 17:15) (118/62 - 148/83)  BP(mean): --  RR: 19 (01 Dec 2021 17:15) (18 - 20)  SpO2: 94% (01 Dec 2021 17:15) (91% - 95%) I&O's Summary    30 Nov 2021 07:01  -  01 Dec 2021 07:00  --------------------------------------------------------  IN: 120 mL / OUT: 1 mL / NET: 119 mL    01 Dec 2021 07:01  -  01 Dec 2021 17:27  --------------------------------------------------------  IN: 120 mL / OUT: 0 mL / NET: 120 mL       GENERAL:  [ ]Alert  [ ]Oriented x   [ ]Lethargic  [ ]Cachexia  [ ]Unarousable  [ ]Verbal  [ ]Non-Verbal  Behavioral:   [ ]Anxiety  [ ]Delirium [ ]Agitation [ ]Other  HEENT:  [ ]Normal   [ ]Dry mouth   [ ]ET Tube/Trach  [ ]Oral lesions  PULMONARY:   [ ]Clear [ ]Tachypnea  [ ]Audible excessive secretions   [ ]Rhonchi        [ ]Right [ ]Left [ ]Bilateral  [ ]Crackles        [ ]Right [ ]Left [ ]Bilateral  [ ]Wheezing     [ ]Right [ ]Left [ ]Bilateral  [ ]Diminished BS [ ] Right [ ]Left [ ]Bilateral  CARDIOVASCULAR:    [ ]Regular [ ]Irregular [ ]Tachy  [ ]Hilario [ ]Murmur [ ]Other  GASTROINTESTINAL:  [ ]Soft  [ ]Distended   [ ]+BS  [ ]Non tender [ ]Tender  [ ]PEG [ ]OGT/ NGT   Last BM:    GENITOURINARY:  [ ]Normal [ ]Incontinent   [ ]Oliguria/Anuria   [ ]Morin  MUSCULOSKELETAL:   [ ]Normal   [ ]Weakness  [ ]Bed/Wheelchair bound [ ]Edema  NEUROLOGIC:   [ ]No focal deficits  [ ] Cognitive impairment  [ ] Dysphagia [ ]Dysarthria [ ] Paresis [ ]Other   SKIN:   [ ]Normal  [ ]Rash   [ ]Pressure ulcer(s) [ ]y [ ]n present on admission    CRITICAL CARE:  [ ]Shock Present  [ ]Septic [ ]Cardiogenic [ ]Neurologic [ ]Hypovolemic  [ ]Vasopressors [ ]Inotropes  [ ]Respiratory failure present [ ]Mechanical Ventilation [ ]Non-invasive ventilatory support [ ]High-Flow   [ ]Acute  [ ]Chronic [ ]Hypoxic  [ ]Hypercarbic [ ]Other  [ ]Other organ failure     LABS:                        13.3   8.57  )-----------( 176      ( 30 Nov 2021 07:44 )             39.6   11-30    136  |  105  |  14  ----------------------------<  122<H>  3.7   |  25  |  1.04    Ca    8.6      30 Nov 2021 07:44    TPro  7.3  /  Alb  3.2<L>  /  TBili  1.1  /  DBili  x   /  AST  26  /  ALT  28  /  AlkPhos  60  11-30        RADIOLOGY & ADDITIONAL STUDIES:    Protein Calorie Malnutrition Present: [ ]mild [ ]moderate [ ]severe [ ]underweight [ ]morbid obesity  https://www.andeal.org/vault/2440/web/files/ONC/Table_Clinical%20Characteristics%20to%20Document%20Malnutrition-White%20JV%20et%20al%202012.pdf    Height (cm): 175.3 (11-28-21 @ 23:37), 175.3 (08-31-21 @ 10:45)  Weight (kg): 100.1 (11-29-21 @ 06:50), 86.2 (08-31-21 @ 10:45)  BMI (kg/m2): 32.6 (11-29-21 @ 06:50), 28.1 (11-28-21 @ 23:37), 28.1 (08-31-21 @ 10:45)    [ ]PPSV2 < or = 30%  [ ]significant weight loss [ ]poor nutritional intake [ ]anasarca    [ ]Artificial Nutrition    REFERRALS:   [ ]Chaplaincy  [ ]Hospice  [ ]Child Life  [ ]Social Work  [ ]Case management [ ]Holistic Therapy     Goals of Care Document:     SUBJECTIVE AND OBJECTIVE: periods of violent agitation, requiring IV dosing of ativan, poor compliance to po regimen  INTERVAL HPI/OVERNIGHT EVENTS:    DNR on chart: yes  Allergies    No Known Allergies    Intolerances    MEDICATIONS  (STANDING):  citalopram 20 milliGRAM(s) Oral daily  diVALproex Sprinkle 250 milliGRAM(s) Oral two times a day  heparin   Injectable 5000 Unit(s) SubCutaneous every 12 hours  LORazepam   Injectable 1 milliGRAM(s) IV Push once  metoprolol succinate ER 50 milliGRAM(s) Oral daily  QUEtiapine 100 milliGRAM(s) Oral at bedtime    MEDICATIONS  (PRN):  LORazepam   Injectable 2 milliGRAM(s) IV Push every 6 hours PRN severe agitation / combativeness  QUEtiapine 12.5 milliGRAM(s) Oral every 6 hours PRN mild agitation      ITEMS UNCHECKED ARE NOT PRESENT    PRESENT SYMPTOMS: [x ]Unable to obtain due to poor mentation   Source if other than patient:  [ ]Family   [ ]Team     Pain:  [ ]yes [ ]no  QOL impact -   Location -                    Aggravating factors -  Quality -  Radiation -  Timing-  Severity (0-10 scale):  Minimal acceptable level (0-10 scale):     Dyspnea:                           [ ]Mild [ ]Moderate [ ]Severe  Anxiety:                             [ ]Mild [ ]Moderate [ ]Severe  Fatigue:                             [ ]Mild [ ]Moderate [ ]Severe  Nausea:                             [ ]Mild [ ]Moderate [ ]Severe  Loss of appetite:              [ ]Mild [ ]Moderate [ ]Severe  Constipation:                    [ ]Mild [ ]Moderate [ ]Severe    CPOT:    https://www.[facility].org/getattachment/[email or web address]/Critical-Care-Pain-Observation-Tool-(CPOT)    PAIN AD Score:	  http://geriatrictoolkit.[facility]/cog/painad.pdf (Ctrl + left click to view)    Other Symptoms:  [ ]All other review of systems negative     Palliative Performance Status Version 2:        20 %      http://npcrc.org/files/news/palliative_performance_scale_ppsv2.pdf  PHYSICAL EXAM:  Vital Signs Last 24 Hrs  T(C): 36.4 (01 Dec 2021 17:15), Max: 36.6 (30 Nov 2021 23:10)  T(F): 97.6 (01 Dec 2021 17:15), Max: 97.8 (30 Nov 2021 23:10)  HR: 90 (01 Dec 2021 17:15) (71 - 90)  BP: 146/84 (01 Dec 2021 17:15) (118/62 - 148/83)  BP(mean): --  RR: 19 (01 Dec 2021 17:15) (18 - 20)  SpO2: 94% (01 Dec 2021 17:15) (91% - 95%) I&O's Summary    30 Nov 2021 07:01  -  01 Dec 2021 07:00  --------------------------------------------------------  IN: 120 mL / OUT: 1 mL / NET: 119 mL    01 Dec 2021 07:01  -  01 Dec 2021 17:27  --------------------------------------------------------  IN: 120 mL / OUT: 0 mL / NET: 120 mL       GENERAL:  [ ]Alert  [ ]Oriented x   [x ]Lethargic  [ ]Cachexia  [ ]Unarousable  [ ]Verbal  [ x]Non-Verbal  Behavioral:   [ ]Anxiety  [ x]Delirium [ ]Agitation [ ]Other  HEENT:  [ ]Normal   [x ]Dry mouth   [ ]ET Tube/Trach  [ ]Oral lesions  PULMONARY:   [ x]Clear [ ]Tachypnea  [ ]Audible excessive secretions   [ ]Rhonchi        [ ]Right [ ]Left [ ]Bilateral  [ ]Crackles        [ ]Right [ ]Left [ ]Bilateral  [ ]Wheezing     [ ]Right [ ]Left [ ]Bilateral  [ ]Diminished BS [ ] Right [ ]Left [ ]Bilateral  CARDIOVASCULAR:    [x ]Regular [ ]Irregular [ ]Tachy  [ ]Hilario [ ]Murmur [ ]Other  GASTROINTESTINAL:  [x ]Soft  [ ]Distended   [ x]+BS  [x ]Non tender [ ]Tender  [ ]PEG [ ]OGT/ NGT   Last BM:  11/30  GENITOURINARY:  [ ]Normal [x ]Incontinent   [ ]Oliguria/Anuria   [ ]Morin  MUSCULOSKELETAL:   [ ]Normal   [x ]Weakness  [ ]Bed/Wheelchair bound [ ]Edema  NEUROLOGIC:   [ ]No focal deficits  [ x] Cognitive impairment  [ x] Dysphagia [ x]Dysarthria [ ] Paresis [x ]Other unsteady gait, intention tremor  SKIN:   [ x]Normal  [ ]Rash   [ ]Pressure ulcer(s) [ ]y [ ]n present on admission    CRITICAL CARE:  [ ]Shock Present  [ ]Septic [ ]Cardiogenic [ ]Neurologic [ ]Hypovolemic  [ ]Vasopressors [ ]Inotropes  [ ]Respiratory failure present [ ]Mechanical Ventilation [ ]Non-invasive ventilatory support [ ]High-Flow   [ ]Acute  [ ]Chronic [ ]Hypoxic  [ ]Hypercarbic [ ]Other  [ ]Other organ failure     LABS:                        13.3   8.57  )-----------( 176      ( 30 Nov 2021 07:44 )             39.6   11-30    136  |  105  |  14  ----------------------------<  122<H>  3.7   |  25  |  1.04    Ca    8.6      30 Nov 2021 07:44    TPro  7.3  /  Alb  3.2<L>  /  TBili  1.1  /  DBili  x   /  AST  26  /  ALT  28  /  AlkPhos  60  11-30        RADIOLOGY & ADDITIONAL STUDIES:    Protein Calorie Malnutrition Present: [ x]mild [ ]moderate [ ]severe [ ]underweight [ ]morbid obesity  https://www.andeal.org/vault/2440/web/files/ONC/Table_Clinical%20Characteristics%20to%20Document%20Malnutrition-White%20JV%20et%20al%202012.pdf    Height (cm): 175.3 (11-28-21 @ 23:37), 175.3 (08-31-21 @ 10:45)  Weight (kg): 100.1 (11-29-21 @ 06:50), 86.2 (08-31-21 @ 10:45)  BMI (kg/m2): 32.6 (11-29-21 @ 06:50), 28.1 (11-28-21 @ 23:37), 28.1 (08-31-21 @ 10:45)    [x ]PPSV2 < or = 30%  [ ]significant weight loss [ ]poor nutritional intake [ ]anasarca    [ ]Artificial Nutrition    REFERRALS:   [ ]Chaplaincy  [ x]Hospice  [ ]Child Life  [ x]Social Work  [ ]Case management [ ]Holistic Therapy     Goals of Care Document:

## 2021-12-01 NOTE — PROGRESS NOTE ADULT - PROBLEM SELECTOR PLAN 7
met with wife Natasha at bedside this morning, discussed alternative options to hospice at home, reassured her that there was not a plan to discharge him now as he is still requiring IV medication for symptoms. Offered support, case discussed with SW to supply other care options to family and reach out to other hospice agencies in our area as well.   D/w Dr. Ayala

## 2021-12-01 NOTE — PROGRESS NOTE ADULT - PROBLEM SELECTOR PLAN 1
pt with agitated delirium   not compliant with PO meds including Seroquel which could help symptoms  has required IV ativan which is just sedating him   consideration of other antipsychotics for mood stabilization with ease of administration   suggest depakote sprinkles   will d/w psych

## 2021-12-02 DIAGNOSIS — A41.89 OTHER SPECIFIED SEPSIS: ICD-10-CM

## 2021-12-02 LAB
ALBUMIN SERPL ELPH-MCNC: 3.2 G/DL — LOW (ref 3.3–5)
ALP SERPL-CCNC: 60 U/L — SIGNIFICANT CHANGE UP (ref 40–120)
ALT FLD-CCNC: 51 U/L — SIGNIFICANT CHANGE UP (ref 12–78)
ANION GAP SERPL CALC-SCNC: 5 MMOL/L — SIGNIFICANT CHANGE UP (ref 5–17)
AST SERPL-CCNC: 42 U/L — HIGH (ref 15–37)
BILIRUB SERPL-MCNC: 0.8 MG/DL — SIGNIFICANT CHANGE UP (ref 0.2–1.2)
BUN SERPL-MCNC: 16 MG/DL — SIGNIFICANT CHANGE UP (ref 7–23)
CALCIUM SERPL-MCNC: 8.9 MG/DL — SIGNIFICANT CHANGE UP (ref 8.5–10.1)
CHLORIDE SERPL-SCNC: 110 MMOL/L — HIGH (ref 96–108)
CO2 SERPL-SCNC: 26 MMOL/L — SIGNIFICANT CHANGE UP (ref 22–31)
CREAT SERPL-MCNC: 0.95 MG/DL — SIGNIFICANT CHANGE UP (ref 0.5–1.3)
GLUCOSE SERPL-MCNC: 112 MG/DL — HIGH (ref 70–99)
HCT VFR BLD CALC: 42.4 % — SIGNIFICANT CHANGE UP (ref 39–50)
HGB BLD-MCNC: 14.4 G/DL — SIGNIFICANT CHANGE UP (ref 13–17)
MCHC RBC-ENTMCNC: 29.6 PG — SIGNIFICANT CHANGE UP (ref 27–34)
MCHC RBC-ENTMCNC: 34 GM/DL — SIGNIFICANT CHANGE UP (ref 32–36)
MCV RBC AUTO: 87.1 FL — SIGNIFICANT CHANGE UP (ref 80–100)
NRBC # BLD: 0 /100 WBCS — SIGNIFICANT CHANGE UP (ref 0–0)
PLATELET # BLD AUTO: 215 K/UL — SIGNIFICANT CHANGE UP (ref 150–400)
POTASSIUM SERPL-MCNC: 3.7 MMOL/L — SIGNIFICANT CHANGE UP (ref 3.5–5.3)
POTASSIUM SERPL-SCNC: 3.7 MMOL/L — SIGNIFICANT CHANGE UP (ref 3.5–5.3)
PROT SERPL-MCNC: 7.2 GM/DL — SIGNIFICANT CHANGE UP (ref 6–8.3)
RBC # BLD: 4.87 M/UL — SIGNIFICANT CHANGE UP (ref 4.2–5.8)
RBC # FLD: 12.5 % — SIGNIFICANT CHANGE UP (ref 10.3–14.5)
SODIUM SERPL-SCNC: 141 MMOL/L — SIGNIFICANT CHANGE UP (ref 135–145)
WBC # BLD: 6.09 K/UL — SIGNIFICANT CHANGE UP (ref 3.8–10.5)
WBC # FLD AUTO: 6.09 K/UL — SIGNIFICANT CHANGE UP (ref 3.8–10.5)

## 2021-12-02 PROCEDURE — 99233 SBSQ HOSP IP/OBS HIGH 50: CPT

## 2021-12-02 PROCEDURE — 99232 SBSQ HOSP IP/OBS MODERATE 35: CPT

## 2021-12-02 RX ORDER — HALOPERIDOL DECANOATE 100 MG/ML
5 INJECTION INTRAMUSCULAR ONCE
Refills: 0 | Status: COMPLETED | OUTPATIENT
Start: 2021-12-02 | End: 2021-12-02

## 2021-12-02 RX ADMIN — Medication 2 MILLIGRAM(S): at 06:54

## 2021-12-02 RX ADMIN — HALOPERIDOL DECANOATE 5 MILLIGRAM(S): 100 INJECTION INTRAMUSCULAR at 14:47

## 2021-12-02 RX ADMIN — CITALOPRAM 20 MILLIGRAM(S): 10 TABLET, FILM COATED ORAL at 12:00

## 2021-12-02 RX ADMIN — Medication 2 MILLIGRAM(S): at 14:47

## 2021-12-02 RX ADMIN — Medication 2 MILLIGRAM(S): at 13:57

## 2021-12-02 RX ADMIN — Medication 2 MILLIGRAM(S): at 19:54

## 2021-12-02 NOTE — PROGRESS NOTE ADULT - ATTENDING COMMENTS
pt seen and examined with NP Tang and independently met with wife Natasha at bedside to discuss medications as well as care options. [facility] is considering case, called liaison Naz , she was not available will reattempt...   Pt has lost IV access and required IM doses of haldol and ativan to calm him to allow staff to attempt new IV access.  Pt remains with violent outbursts and may initially be best served in an institution that specializes in Alzheimer's dementia with agitation, family has requested he be home due to financial constraints. Will continue to follow.  Discussed in detail with DILLAN and Dr. Chau.

## 2021-12-02 NOTE — PROGRESS NOTE ADULT - SUBJECTIVE AND OBJECTIVE BOX
Patient is a 80y old  Male who presents with a chief complaint of AMS (30 Nov 2021 15:12)    INTERVAL HPI/OVERNIGHT EVENTS: Patient was restless and agitated. HD stable.     MEDICATIONS  (STANDING):  citalopram 20 milliGRAM(s) Oral daily  diVALproex Sprinkle 250 milliGRAM(s) Oral two times a day  heparin   Injectable 5000 Unit(s) SubCutaneous every 12 hours  LORazepam   Injectable 1 milliGRAM(s) IV Push once  metoprolol succinate ER 50 milliGRAM(s) Oral daily  QUEtiapine 100 milliGRAM(s) Oral at bedtime    MEDICATIONS  (PRN):  LORazepam   Injectable 2 milliGRAM(s) IV Push every 6 hours PRN severe agitation / combativeness  QUEtiapine 12.5 milliGRAM(s) Oral every 6 hours PRN mild agitation      Allergies    No Known Allergies    Intolerances        REVIEW OF SYSTEMS: all negative with exception of above    Vital Signs Last 24 Hrs  T(C): 36.7 (02 Dec 2021 11:41), Max: 36.7 (02 Dec 2021 11:41)  T(F): 98.1 (02 Dec 2021 11:41), Max: 98.1 (02 Dec 2021 11:41)  HR: 88 (02 Dec 2021 11:41) (75 - 90)  BP: 126/81 (02 Dec 2021 11:40) (126/81 - 146/84)  BP(mean): --  RR: 19 (02 Dec 2021 11:41) (19 - 19)  SpO2: 94% (02 Dec 2021 11:41) (91% - 94%)    PHYSICAL EXAM:  General: patient in no acute distress, resting comfortably  Cardio: S1/S2 +ve, regular rate and rhythm, no M/G/R  Resp: clear to ausculation bilaterally, no rales or wheezes  GI: abdomen soft, nontender, non distended, no guarding, BS +ve x 4  Neuro: unresponsive, recently received sedation  Skin: No rashes or lesions    LABS:                                   14.4   6.09  )-----------( 215      ( 02 Dec 2021 10:21 )             42.4     12-02    141  |  110<H>  |  16  ----------------------------<  112<H>  3.7   |  26  |  0.95    Ca    8.9      02 Dec 2021 10:21    TPro  7.2  /  Alb  3.2<L>  /  TBili  0.8  /  DBili  x   /  AST  42<H>  /  ALT  51  /  AlkPhos  60  12-02            CAPILLARY BLOOD GLUCOSE          RADIOLOGY & ADDITIONAL TESTS:    Imaging Personally Reviewed:  [ ] YES  [ ] NO    Consultant(s) Notes Reviewed:  [ ] YES  [ ] NO    Care Discussed with Consultants/Other Providers [ ] YES  [ ] NO

## 2021-12-02 NOTE — PROGRESS NOTE ADULT - TIME BILLING
evaluation of pt, review of records, collaboration with care teams and family
evaluation of pt, review of records, collaboration with family and care teams

## 2021-12-02 NOTE — PROGRESS NOTE ADULT - PROBLEM SELECTOR PLAN 7
Wife, Natasha at bedside.  She was aware patient continues to require IV medication for agitation.  Wife concerned about patient's aggression and agitation as it has been an issue in the hospital and prior to admission but is agreeable with plan to continue managing symptoms with medications ordered.  Patient has minimal po intake and is eating 1-2 spoonfuls of pudding which is how they have been trying to give him his medications.  Patient has a "sweet tooth" per wife. Wife, Natasha at bedside.  She was aware patient continues to require IV medication for agitation.  Wife concerned about patient's aggression and agitation as it has been an issue in the hospital and prior to admission but is agreeable with plan to continue managing symptoms with medications ordered.  Patient has minimal po intake and is eating 1-2 spoonfuls of pudding which is how they have been trying to give him his medications.  Patient has a "sweet tooth" per wife. Support and reassurance given.  Family working with SW to eval all options of care with goal to get pt home with support for wife. Hospice agencies including [facility], [facility] and [facility] have been asked to evaluate. Will follow.  D/W Dr. Chau

## 2021-12-02 NOTE — PHYSICAL THERAPY INITIAL EVALUATION ADULT - PERTINENT HX OF CURRENT PROBLEM, REHAB EVAL
Patient is an 80M with a PMH of advanced dementia, HTN, HLD, BPH  who presents to the ED for AMS.  Patient mostly nonverbal, currently unarousable due to recently receiving medication for sedation.  Patient unable to provide history.  Wife - Natasha - at the bedside.  Patient reportedly has had increased lethargy today, is not following his normal routine at home.  Patient typically able to walk short distances inside his home but now is unable to get up to use the bathroom.

## 2021-12-02 NOTE — PROGRESS NOTE ADULT - SUBJECTIVE AND OBJECTIVE BOX
SUBJECTIVE AND OBJECTIVE: patient sleeping in bed with wife and 1:1 at bedside.    INTERVAL HPI/OVERNIGHT EVENTS: patient has had multiple episodes of agitation and received several doses of Ativan IVP.    DNR on chart: yes  Allergies    No Known Allergies    Intolerances    MEDICATIONS  (STANDING):  citalopram 20 milliGRAM(s) Oral daily  diVALproex Sprinkle 250 milliGRAM(s) Oral two times a day  heparin   Injectable 5000 Unit(s) SubCutaneous every 12 hours  LORazepam   Injectable 1 milliGRAM(s) IV Push once  metoprolol succinate ER 50 milliGRAM(s) Oral daily  QUEtiapine 100 milliGRAM(s) Oral at bedtime    MEDICATIONS  (PRN):  LORazepam   Injectable 2 milliGRAM(s) IV Push every 6 hours PRN severe agitation / combativeness  QUEtiapine 12.5 milliGRAM(s) Oral every 6 hours PRN mild agitation      ITEMS UNCHECKED ARE NOT PRESENT    PRESENT SYMPTOMS: [x ]Unable to obtain due to poor mentation   Source if other than patient:  [ ]Family   [ ]Team     Pain:  [ ]yes [ ]no  QOL impact -   Location -                    Aggravating factors -  Quality -  Radiation -  Timing-  Severity (0-10 scale):  Minimal acceptable level (0-10 scale):     Dyspnea:                           [ ]Mild [ ]Moderate [ ]Severe  Anxiety:                             [ ]Mild [ ]Moderate [ ]Severe  Fatigue:                             [ ]Mild [ ]Moderate [ ]Severe  Nausea:                             [ ]Mild [ ]Moderate [ ]Severe  Loss of appetite:              [ ]Mild [ ]Moderate [ ]Severe  Constipation:                    [ ]Mild [ ]Moderate [ ]Severe    PAIN AD Score:	1  [email or web address] (Ctrl + left click to view)    Other Symptoms:  [ ]All other review of systems negative     Palliative Performance Status Version 2:       50 %      http://npcrc.org/files/news/palliative_performance_scale_ppsv2.pdf  PHYSICAL EXAM:  Vital Signs Last 24 Hrs  T(C): 36.6 (02 Dec 2021 05:38), Max: 36.6 (01 Dec 2021 23:34)  T(F): 97.9 (02 Dec 2021 05:38), Max: 97.9 (02 Dec 2021 05:38)  HR: 75 (02 Dec 2021 05:38) (75 - 90)  BP: 134/71 (02 Dec 2021 05:38) (129/79 - 146/84)  BP(mean): --  RR: 19 (02 Dec 2021 05:38) (19 - 19)  SpO2: 91% (02 Dec 2021 05:38) (91% - 94%) I&O's Summary    01 Dec 2021 07:01  -  02 Dec 2021 07:00  --------------------------------------------------------  IN: 120 mL / OUT: 0 mL / NET: 120 mL       GENERAL:  [ ]Alert  [ ]Oriented x   [x ]Lethargic  [ ]Cachexia  [ ]Unarousable  [ ]Verbal  [ ]Non-Verbal  Behavioral:   [ ]Anxiety  [ ]Delirium [ ]Agitation [ ]Other  HEENT:  [ ]Normal   [ ]Dry mouth   [ ]ET Tube/Trach  [ ]Oral lesions  PULMONARY:   [ ]Clear [ ]Tachypnea  [ ]Audible excessive secretions   [ ]Rhonchi        [ ]Right [ ]Left [ ]Bilateral  [ ]Crackles        [ ]Right [ ]Left [ ]Bilateral  [ ]Wheezing     [ ]Right [ ]Left [ ]Bilateral  [x ]Diminished BS [ ] Right [ ]Left [x ]Bilateral  CARDIOVASCULAR:    [x ]Regular [ ]Irregular [ ]Tachy  [ ]Hilario [ ]Murmur [ ]Other  GASTROINTESTINAL:  [x ]Soft  [ ]Distended   [x ]+BS  [ ]Non tender [ ]Tender  [ ]PEG [ ]OGT/ NGT   Last BM:    GENITOURINARY:  [ ]Normal [x ]Incontinent  sometimes ambulates with assistance to the bathroom [ ]Oliguria/Anuria   [ ]Morin  MUSCULOSKELETAL:   [ ]Normal   [x]Weakness  [ ]Bed/Wheelchair bound [ ]Edema  NEUROLOGIC:   [ ]No focal deficits  [ x] Cognitive impairment  [ ] Dysphagia [ ]Dysarthria [ ] Paresis [ ]Other   SKIN:   [x ]Normal  [ ]Rash   [ ]Pressure ulcer(s) [ ]y [ ]n present on admission    CRITICAL CARE:  [ ]Shock Present  [ ]Septic [ ]Cardiogenic [ ]Neurologic [ ]Hypovolemic  [ ]Vasopressors [ ]Inotropes  [ ]Respiratory failure present [ ]Mechanical Ventilation [ ]Non-invasive ventilatory support [ ]High-Flow  [ ]Acute  [ ]Chronic [ ]Hypoxic  [ ]Hypercarbic [ ]Other  [ ]Other organ failure     LABS:                        14.4   6.09  )-----------( 215      ( 02 Dec 2021 10:21 )             42.4   12-02    141  |  110<H>  |  16  ----------------------------<  112<H>  3.7   |  26  |  0.95    Ca    8.9      02 Dec 2021 10:21    TPro  7.2  /  Alb  3.2<L>  /  TBili  0.8  /  DBili  x   /  AST  42<H>  /  ALT  51  /  AlkPhos  60  12-02    RADIOLOGY & ADDITIONAL STUDIES:    Protein Calorie Malnutrition Present: [ ]mild [x ]moderate [ ]severe [ ]underweight [ ]morbid obesity  https://www.andeal.org/vault/2440/web/files/ONC/Table_Clinical%20Characteristics%20to%20Document%20Malnutrition-White%20JV%20et%20al%202012.pdf    Height (cm): 175.3 (11-28-21 @ 23:37), 175.3 (08-31-21 @ 10:45)  Weight (kg): 100.1 (11-29-21 @ 06:50), 86.2 (08-31-21 @ 10:45)  BMI (kg/m2): 32.6 (11-29-21 @ 06:50), 28.1 (11-28-21 @ 23:37), 28.1 (08-31-21 @ 10:45)    [ ]PPSV2 < or = 30%  [ ]significant weight loss [x ]poor nutritional intake [ ]anasarca    [ ]Artificial Nutrition    REFERRALS:   [ ]Chaplaincy  [ ]Hospice  [ ]Child Life  [ ]Social Work  [ ]Case management [ ]Holistic Therapy     Goals of Care Document:     SUBJECTIVE AND OBJECTIVE: patient sleeping in bed with wife and 1:1 at bedside.    INTERVAL HPI/OVERNIGHT EVENTS: patient has had multiple episodes of agitation and received several doses of Ativan IVP.    DNR on chart: yes  Allergies    No Known Allergies    Intolerances    MEDICATIONS  (STANDING):  citalopram 20 milliGRAM(s) Oral daily  diVALproex Sprinkle 250 milliGRAM(s) Oral two times a day  heparin   Injectable 5000 Unit(s) SubCutaneous every 12 hours  LORazepam   Injectable 1 milliGRAM(s) IV Push once  metoprolol succinate ER 50 milliGRAM(s) Oral daily  QUEtiapine 100 milliGRAM(s) Oral at bedtime    MEDICATIONS  (PRN):  LORazepam   Injectable 2 milliGRAM(s) IV Push every 6 hours PRN severe agitation / combativeness  QUEtiapine 12.5 milliGRAM(s) Oral every 6 hours PRN mild agitation      ITEMS UNCHECKED ARE NOT PRESENT    PRESENT SYMPTOMS: [x ]Unable to obtain due to poor mentation   Source if other than patient:  [ ]Family   [ ]Team     Pain:  [ ]yes [ ]no  QOL impact -   Location -                    Aggravating factors -  Quality -  Radiation -  Timing-  Severity (0-10 scale):  Minimal acceptable level (0-10 scale):     Dyspnea:                           [ ]Mild [ ]Moderate [ ]Severe  Anxiety:                             [ ]Mild [ ]Moderate [ ]Severe  Fatigue:                             [ ]Mild [ ]Moderate [ ]Severe  Nausea:                             [ ]Mild [ ]Moderate [ ]Severe  Loss of appetite:              [ ]Mild [ ]Moderate [ ]Severe  Constipation:                    [ ]Mild [ ]Moderate [ ]Severe    PAIN AD Score:	1  [email or web address] (Ctrl + left click to view)    Other Symptoms:  [ ]All other review of systems negative     Palliative Performance Status Version 2:       50 %      http://npcrc.org/files/news/palliative_performance_scale_ppsv2.pdf  PHYSICAL EXAM:  Vital Signs Last 24 Hrs  T(C): 36.6 (02 Dec 2021 05:38), Max: 36.6 (01 Dec 2021 23:34)  T(F): 97.9 (02 Dec 2021 05:38), Max: 97.9 (02 Dec 2021 05:38)  HR: 75 (02 Dec 2021 05:38) (75 - 90)  BP: 134/71 (02 Dec 2021 05:38) (129/79 - 146/84)  BP(mean): --  RR: 19 (02 Dec 2021 05:38) (19 - 19)  SpO2: 91% (02 Dec 2021 05:38) (91% - 94%) I&O's Summary    01 Dec 2021 07:01  -  02 Dec 2021 07:00  --------------------------------------------------------  IN: 120 mL / OUT: 0 mL / NET: 120 mL       GENERAL:  [ ]Alert  [ ]Oriented x   [x ]Lethargic  [ ]Cachexia  [ ]Unarousable  [ ]Verbal  [ ]Non-Verbal  Behavioral:   [ ]Anxiety  [ ]Delirium [ ]Agitation [ ]Other  HEENT:  [ ]Normal   [ ]Dry mouth   [ ]ET Tube/Trach  [ ]Oral lesions  PULMONARY:   [ ]Clear [ ]Tachypnea  [ ]Audible excessive secretions   [ ]Rhonchi        [ ]Right [ ]Left [ ]Bilateral  [ ]Crackles        [ ]Right [ ]Left [ ]Bilateral  [ ]Wheezing     [ ]Right [ ]Left [ ]Bilateral  [x ]Diminished BS [ ] Right [ ]Left [x ]Bilateral  CARDIOVASCULAR:    [x ]Regular [ ]Irregular [ ]Tachy  [ ]Hilario [ ]Murmur [ ]Other  GASTROINTESTINAL:  [x ]Soft  [ ]Distended   [x ]+BS  [ ]Non tender [ ]Tender  [ ]PEG [ ]OGT/ NGT   Last BM:    GENITOURINARY:  [ ]Normal [x ]Incontinent [ ]Oliguria/Anuria   [ ]Morin  MUSCULOSKELETAL:   [ ]Normal   [x]Weakness  [ ]Bed/Wheelchair bound [ ]Edema  NEUROLOGIC:   [ ]No focal deficits  [ x] Cognitive impairment  [ ] Dysphagia [ ]Dysarthria [ ] Paresis [ ]Other   SKIN:   [x ]Normal  [ ]Rash   [ ]Pressure ulcer(s) [ ]y [ ]n present on admission    CRITICAL CARE:  [ ]Shock Present  [ ]Septic [ ]Cardiogenic [ ]Neurologic [ ]Hypovolemic  [ ]Vasopressors [ ]Inotropes  [ ]Respiratory failure present [ ]Mechanical Ventilation [ ]Non-invasive ventilatory support [ ]High-Flow  [ ]Acute  [ ]Chronic [ ]Hypoxic  [ ]Hypercarbic [ ]Other  [ ]Other organ failure     LABS:                        14.4   6.09  )-----------( 215      ( 02 Dec 2021 10:21 )             42.4   12-02    141  |  110<H>  |  16  ----------------------------<  112<H>  3.7   |  26  |  0.95    Ca    8.9      02 Dec 2021 10:21    TPro  7.2  /  Alb  3.2<L>  /  TBili  0.8  /  DBili  x   /  AST  42<H>  /  ALT  51  /  AlkPhos  60  12-02    RADIOLOGY & ADDITIONAL STUDIES:    Protein Calorie Malnutrition Present: [ ]mild [x ]moderate [ ]severe [ ]underweight [ ]morbid obesity  https://www.andeal.org/vault/2440/web/files/ONC/Table_Clinical%20Characteristics%20to%20Document%20Malnutrition-White%20JV%20et%20al%202012.pdf    Height (cm): 175.3 (11-28-21 @ 23:37), 175.3 (08-31-21 @ 10:45)  Weight (kg): 100.1 (11-29-21 @ 06:50), 86.2 (08-31-21 @ 10:45)  BMI (kg/m2): 32.6 (11-29-21 @ 06:50), 28.1 (11-28-21 @ 23:37), 28.1 (08-31-21 @ 10:45)    [ ]PPSV2 < or = 30%  [ ]significant weight loss [x ]poor nutritional intake [ ]anasarca    [ ]Artificial Nutrition    REFERRALS:   [ ]Chaplaincy  [ ]Hospice  [ ]Child Life  [ ]Social Work  [ ]Case management [ ]Holistic Therapy     Goals of Care Document:     SUBJECTIVE AND OBJECTIVE: patient sleeping in bed with wife and 1:1 at bedside.    INTERVAL HPI/OVERNIGHT EVENTS: patient has had multiple episodes of agitation and received several doses of Ativan IVP.    DNR on chart: yes  Allergies    No Known Allergies    Intolerances    MEDICATIONS  (STANDING):  citalopram 20 milliGRAM(s) Oral daily  diVALproex Sprinkle 250 milliGRAM(s) Oral two times a day  heparin   Injectable 5000 Unit(s) SubCutaneous every 12 hours  LORazepam   Injectable 1 milliGRAM(s) IV Push once  metoprolol succinate ER 50 milliGRAM(s) Oral daily  QUEtiapine 100 milliGRAM(s) Oral at bedtime    MEDICATIONS  (PRN):  LORazepam   Injectable 2 milliGRAM(s) IV Push every 6 hours PRN severe agitation / combativeness  QUEtiapine 12.5 milliGRAM(s) Oral every 6 hours PRN mild agitation      ITEMS UNCHECKED ARE NOT PRESENT    PRESENT SYMPTOMS: [x ]Unable to obtain due to poor mentation   Source if other than patient:  [ ]Family   [ ]Team     Pain:  [ ]yes [ ]no  QOL impact -   Location -                    Aggravating factors -  Quality -  Radiation -  Timing-  Severity (0-10 scale):  Minimal acceptable level (0-10 scale):     Dyspnea:                           [ ]Mild [ ]Moderate [ ]Severe  Anxiety:                             [ ]Mild [ ]Moderate [ ]Severe  Fatigue:                             [ ]Mild [ ]Moderate [ ]Severe  Nausea:                             [ ]Mild [ ]Moderate [ ]Severe  Loss of appetite:              [ ]Mild [ ]Moderate [ ]Severe  Constipation:                    [ ]Mild [ ]Moderate [ ]Severe    PAIN AD Score:	1  [email or web address] (Ctrl + left click to view)    Other Symptoms:  [ ]All other review of systems negative     Palliative Performance Status Version 2:       50 %      http://npcrc.org/files/news/palliative_performance_scale_ppsv2.pdf  PHYSICAL EXAM:  Vital Signs Last 24 Hrs  T(C): 36.6 (02 Dec 2021 05:38), Max: 36.6 (01 Dec 2021 23:34)  T(F): 97.9 (02 Dec 2021 05:38), Max: 97.9 (02 Dec 2021 05:38)  HR: 75 (02 Dec 2021 05:38) (75 - 90)  BP: 134/71 (02 Dec 2021 05:38) (129/79 - 146/84)  BP(mean): --  RR: 19 (02 Dec 2021 05:38) (19 - 19)  SpO2: 91% (02 Dec 2021 05:38) (91% - 94%) I&O's Summary    01 Dec 2021 07:01  -  02 Dec 2021 07:00  --------------------------------------------------------  IN: 120 mL / OUT: 0 mL / NET: 120 mL       GENERAL:  [ ]Alert  [ ]Oriented x   [x ]Lethargic  [ ]Cachexia  [ ]Unarousable  [ ]Verbal  [ ]Non-Verbal  Behavioral:   [ ]Anxiety  [ ]Delirium [ ]Agitation [ ]Other  HEENT:  [ ]Normal   [ ]Dry mouth   [ ]ET Tube/Trach  [ ]Oral lesions  PULMONARY:   [ ]Clear [ ]Tachypnea  [ ]Audible excessive secretions   [ ]Rhonchi        [ ]Right [ ]Left [ ]Bilateral  [ ]Crackles        [ ]Right [ ]Left [ ]Bilateral  [ ]Wheezing     [ ]Right [ ]Left [ ]Bilateral  [x ]Diminished BS [ ] Right [ ]Left [x ]Bilateral  CARDIOVASCULAR:    [x ]Regular [ ]Irregular [ ]Tachy  [ ]Hilario [ ]Murmur [ ]Other  GASTROINTESTINAL:  [x ]Soft  [ ]Distended   [x ]+BS  [ ]Non tender [ ]Tender  [ ]PEG [ ]OGT/ NGT   Last BM:    GENITOURINARY:  [ ]Normal [x ]Incontinent [ ]Oliguria/Anuria   [ ]Morin  MUSCULOSKELETAL:   [ ]Normal   [x]Weakness  [ ]Bed/Wheelchair bound [ ]Edema  NEUROLOGIC:   [ ]No focal deficits  [ x] Cognitive impairment  [ ] Dysphagia [ ]Dysarthria [ ] Paresis [ ]Other   SKIN:   [x ]Normal  [ ]Rash   [ ]Pressure ulcer(s) [ ]y [ ]n present on admission    CRITICAL CARE:  [ ]Shock Present  [ ]Septic [ ]Cardiogenic [ ]Neurologic [ ]Hypovolemic  [ ]Vasopressors [ ]Inotropes  [ ]Respiratory failure present [ ]Mechanical Ventilation [ ]Non-invasive ventilatory support [ ]High-Flow  [ ]Acute  [ ]Chronic [ ]Hypoxic  [ ]Hypercarbic [ ]Other  [ ]Other organ failure     LABS:                        14.4   6.09  )-----------( 215      ( 02 Dec 2021 10:21 )             42.4   12-02    141  |  110<H>  |  16  ----------------------------<  112<H>  3.7   |  26  |  0.95    Ca    8.9      02 Dec 2021 10:21    TPro  7.2  /  Alb  3.2<L>  /  TBili  0.8  /  DBili  x   /  AST  42<H>  /  ALT  51  /  AlkPhos  60  12-02    RADIOLOGY & ADDITIONAL STUDIES:    Protein Calorie Malnutrition Present: [ ]mild [x ]moderate [ ]severe [ ]underweight [ ]morbid obesity  https://www.andeal.org/vault/2440/web/files/ONC/Table_Clinical%20Characteristics%20to%20Document%20Malnutrition-White%20JV%20et%20al%202012.pdf    Height (cm): 175.3 (11-28-21 @ 23:37), 175.3 (08-31-21 @ 10:45)  Weight (kg): 100.1 (11-29-21 @ 06:50), 86.2 (08-31-21 @ 10:45)  BMI (kg/m2): 32.6 (11-29-21 @ 06:50), 28.1 (11-28-21 @ 23:37), 28.1 (08-31-21 @ 10:45)    [ ]PPSV2 < or = 30%  [ ]significant weight loss [x ]poor nutritional intake [ ]anasarca    [ ]Artificial Nutrition    REFERRALS:   [ ]Chaplaincy  [x ]Hospice  [ ]Child Life  [ x]Social Work  [ ]Case management [ ]Holistic Therapy     Goals of Care Document:

## 2021-12-02 NOTE — PROGRESS NOTE ADULT - PROBLEM SELECTOR PLAN 1
POA  102 temp and wbc of 15k on admission   suspect 2/2 PNA which was seen on cta   c/w abx for a total of 5 days   clinically improving with normal wbc and remains afebrile since admission

## 2021-12-02 NOTE — PHYSICAL THERAPY INITIAL EVALUATION ADULT - LEVEL OF INDEPENDENCE: GAIT, REHAB EVAL
eyes closed, attempts to return to sitting, flexed trunk and B knees in standing/moderate assist (50% patients effort)

## 2021-12-02 NOTE — PROGRESS NOTE ADULT - PROBLEM SELECTOR PLAN 1
pt with agitated delirium   not compliant with PO meds including Seroquel which could help symptoms  has required IV ativan which is just sedating him   consideration of other antipsychotics for mood stabilization with ease of administration   suggest depakote sprinkles pt with agitated delirium   not compliant with PO meds including Seroquel which could help symptoms  has required IV ativan which is just sedating him   has had one dose of depakote sprinkles

## 2021-12-02 NOTE — PHYSICAL THERAPY INITIAL EVALUATION ADULT - GAIT TRAINING, PT EVAL
Independent in ambulation with use of RW device up to 50feet observing proper gait pattern, posture and use of walking device safely.

## 2021-12-02 NOTE — CHART NOTE - NSCHARTNOTEFT_GEN_A_CORE
Patient examined at bedside after CO 1:1 staff reported that IV may have been infiltrated. Patient's left forearm palpated and inspected with a "wet sponge" quality, diameter larger than right forearm, all consistent with infiltration. Pt recently got Ativan 2mg IVP for agitation with no observed clinical effect as likely most of the medication did not reach the vein via the infiltrated IV site. Patient also has been refusing PO medications. Patient needs IV access hence line has to be changed. Given Pt's advanced dementia and known history of violence, aggressive behavior, strong physique need for treatment at this time is paramount to ensure staff and Patient safety in order to switch out the line. Hence ordered haldol 5mg IM with Ativan 2mg IM x 1 dose to be given to Patient to facilitate line access, which is vital to his continue care; benefits > riskls at this time. QTc is within normal limits.

## 2021-12-03 PROCEDURE — 99231 SBSQ HOSP IP/OBS SF/LOW 25: CPT

## 2021-12-03 PROCEDURE — 99233 SBSQ HOSP IP/OBS HIGH 50: CPT

## 2021-12-03 RX ADMIN — DIVALPROEX SODIUM 250 MILLIGRAM(S): 500 TABLET, DELAYED RELEASE ORAL at 17:08

## 2021-12-03 RX ADMIN — QUETIAPINE FUMARATE 12.5 MILLIGRAM(S): 200 TABLET, FILM COATED ORAL at 12:35

## 2021-12-03 RX ADMIN — Medication 1 MILLIGRAM(S): at 17:45

## 2021-12-03 RX ADMIN — QUETIAPINE FUMARATE 100 MILLIGRAM(S): 200 TABLET, FILM COATED ORAL at 21:57

## 2021-12-03 RX ADMIN — HEPARIN SODIUM 5000 UNIT(S): 5000 INJECTION INTRAVENOUS; SUBCUTANEOUS at 17:08

## 2021-12-03 RX ADMIN — CITALOPRAM 20 MILLIGRAM(S): 10 TABLET, FILM COATED ORAL at 12:35

## 2021-12-03 RX ADMIN — HEPARIN SODIUM 5000 UNIT(S): 5000 INJECTION INTRAVENOUS; SUBCUTANEOUS at 05:56

## 2021-12-03 NOTE — BH CONSULTATION LIAISON PROGRESS NOTE - NSCDBILL_PSY_A_CORE
[id number] - Inpatient, low complexity
[id number] - Inpatient, low complexity
[id number] - Inpatient, low complexity
[id number] - Inpatient, moderate complexity

## 2021-12-03 NOTE — BH CONSULTATION LIAISON PROGRESS NOTE - CURRENT MEDICATION
MEDICATIONS  (STANDING):  citalopram 20 milliGRAM(s) Oral daily  diVALproex Sprinkle 250 milliGRAM(s) Oral two times a day  heparin   Injectable 5000 Unit(s) SubCutaneous every 12 hours  LORazepam   Injectable 1 milliGRAM(s) IV Push once  metoprolol succinate ER 50 milliGRAM(s) Oral daily  QUEtiapine 100 milliGRAM(s) Oral at bedtime    MEDICATIONS  (PRN):  LORazepam   Injectable 2 milliGRAM(s) IV Push every 6 hours PRN severe agitation / combativeness  QUEtiapine 12.5 milliGRAM(s) Oral every 6 hours PRN mild agitation  
MEDICATIONS  (STANDING):  citalopram 20 milliGRAM(s) Oral daily  diVALproex Sprinkle 250 milliGRAM(s) Oral two times a day  heparin   Injectable 5000 Unit(s) SubCutaneous every 12 hours  LORazepam   Injectable 1 milliGRAM(s) IV Push once  LORazepam   Injectable 1 milliGRAM(s) IV Push once  metoprolol succinate ER 50 milliGRAM(s) Oral daily  QUEtiapine 100 milliGRAM(s) Oral at bedtime    MEDICATIONS  (PRN):  LORazepam   Injectable 2 milliGRAM(s) IV Push every 6 hours PRN severe agitation / combativeness  QUEtiapine 12.5 milliGRAM(s) Oral every 6 hours PRN mild agitation  
MEDICATIONS  (STANDING):  citalopram 20 milliGRAM(s) Oral daily  diVALproex Sprinkle 250 milliGRAM(s) Oral two times a day  heparin   Injectable 5000 Unit(s) SubCutaneous every 12 hours  LORazepam   Injectable 1 milliGRAM(s) IV Push once  metoprolol succinate ER 50 milliGRAM(s) Oral daily  QUEtiapine 100 milliGRAM(s) Oral at bedtime    MEDICATIONS  (PRN):  LORazepam   Injectable 2 milliGRAM(s) IV Push every 6 hours PRN severe agitation / combativeness  QUEtiapine 12.5 milliGRAM(s) Oral every 6 hours PRN mild agitation  
MEDICATIONS  (STANDING):  atorvastatin 10 milliGRAM(s) Oral at bedtime  cefTRIAXone   IVPB 1000 milliGRAM(s) IV Intermittent every 24 hours  citalopram 20 milliGRAM(s) Oral daily  heparin   Injectable 5000 Unit(s) SubCutaneous every 12 hours  LORazepam   Injectable 1 milliGRAM(s) IV Push once  memantine 10 milliGRAM(s) Oral at bedtime  metoprolol tartrate 25 milliGRAM(s) Oral two times a day  QUEtiapine 50 milliGRAM(s) Oral at bedtime  tamsulosin 0.4 milliGRAM(s) Oral at bedtime    MEDICATIONS  (PRN):  LORazepam   Injectable 2 milliGRAM(s) IV Push every 6 hours PRN severe agitation / combativeness  QUEtiapine 12.5 milliGRAM(s) Oral every 6 hours PRN mild agitation

## 2021-12-03 NOTE — BH CONSULTATION LIAISON PROGRESS NOTE - NSBHASSESSMENTFT_PSY_ALL_CORE
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 + leukocytosis to 15, lactate 6.4 > 1.2. CXR w/o evidence infectious process, UA + though to leuk est. + agitated, AMS in ED; On Namenda 10mg po bid , Seroquel 50mg po qhs and Celexa 20mg PO qd, rivastigmine 4.5mg PO bid     
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 + leukocytosis to 15, lactate 6.4 > 1.2. CXR w/o evidence infectious process, UA + though to leuk est. + agitated, AMS in ED; On Namenda 10mg po bid , Seroquel 50mg po qhs and Celexa 20mg PO qd, rivastigmine 4.5mg PO bid     
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 + leukocytosis to 15, lactate 6.4 > 1.2. CXR w/o evidence infectious process, UA + though to leuk est. + agitated, AMS in ED; On Namenda 10mg po bid , Seroquel 50mg po qhs and Celexa 20mg PO qd, rivastigmine 4.5mg PO bid at home with no clinical theraputic effect.     
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 + leukocytosis to 15, lactate 6.4 > 1.2. CXR w/o evidence infectious process, UA + though to leuk est. + agitated, AMS in ED; On Namenda 10mg po bid , Seroquel 50mg po qhs and Celexa 20mg PO qd, rivastigmine 4.5mg PO bid at home with no clinical theraputic effect.

## 2021-12-03 NOTE — BH CONSULTATION LIAISON PROGRESS NOTE - NSBHFUPREASONCONS_PSY_A_CORE
delirium/dementia/agitation/med management

## 2021-12-03 NOTE — PROGRESS NOTE ADULT - SUBJECTIVE AND OBJECTIVE BOX
Patient is a 80y old  Male who presents with a chief complaint of AMS (30 Nov 2021 15:12)    INTERVAL HPI/OVERNIGHT EVENTS: got one prn overnight     MEDICATIONS  (STANDING):  citalopram 20 milliGRAM(s) Oral daily  diVALproex Sprinkle 250 milliGRAM(s) Oral two times a day  heparin   Injectable 5000 Unit(s) SubCutaneous every 12 hours  LORazepam   Injectable 1 milliGRAM(s) IV Push once  LORazepam   Injectable 1 milliGRAM(s) IV Push once  metoprolol succinate ER 50 milliGRAM(s) Oral daily  QUEtiapine 100 milliGRAM(s) Oral at bedtime    MEDICATIONS  (PRN):  LORazepam   Injectable 2 milliGRAM(s) IV Push every 6 hours PRN severe agitation / combativeness  QUEtiapine 12.5 milliGRAM(s) Oral every 6 hours PRN mild agitation    Allergies    No Known Allergies    Intolerances        REVIEW OF SYSTEMS: all negative with exception of above      Vital Signs Last 24 Hrs  T(C): 37 (03 Dec 2021 05:54), Max: 37.1 (02 Dec 2021 16:39)  T(F): 98.6 (03 Dec 2021 05:54), Max: 98.7 (02 Dec 2021 16:39)  HR: 63 (03 Dec 2021 05:54) (63 - 79)  BP: 158/74 (03 Dec 2021 05:54) (151/88 - 158/74)  BP(mean): --  RR: 18 (03 Dec 2021 05:54) (18 - 18)  SpO2: 97% (03 Dec 2021 05:54) (95% - 97%)    PHYSICAL EXAM:  General: patient in no acute distress, resting comfortably  Cardio: S1/S2 +ve, regular rate and rhythm, no M/G/R  Resp: clear to ausculation bilaterally, no rales or wheezes  GI: abdomen soft, nontender, non distended, no guarding, BS +ve x 4  Neuro: unresponsive, recently received sedation  Skin: No rashes or lesions    LABS:                                            14.4   6.09  )-----------( 215      ( 02 Dec 2021 10:21 )             42.4     12-02    141  |  110<H>  |  16  ----------------------------<  112<H>  3.7   |  26  |  0.95    Ca    8.9      02 Dec 2021 10:21    TPro  7.2  /  Alb  3.2<L>  /  TBili  0.8  /  DBili  x   /  AST  42<H>  /  ALT  51  /  AlkPhos  60  12-02                  CAPILLARY BLOOD GLUCOSE          RADIOLOGY & ADDITIONAL TESTS:    Imaging Personally Reviewed:  [ ] YES  [ ] NO    Consultant(s) Notes Reviewed:  [ ] YES  [ ] NO    Care Discussed with Consultants/Other Providers [ ] YES  [ ] NO no back pain, no gout, no musculoskeletal pain, no neck pain, and no weakness.

## 2021-12-03 NOTE — PROGRESS NOTE ADULT - PROBLEM SELECTOR PLAN 1
POA  102 temp and wbc of 15k on admission   suspect 2/2 PNA which was seen on cta   c/w abx for a total of 5 days   clinically improving with normal wbc and remains afebrile since admission  normal or sats now

## 2021-12-03 NOTE — BH CONSULTATION LIAISON PROGRESS NOTE - NSICDXBHSECONDARYDX_PSY_ALL_CORE
Metabolic encephalopathy   G93.41  Alzheimer's dementia   G30.9  

## 2021-12-03 NOTE — BH CONSULTATION LIAISON PROGRESS NOTE - NSBHCONSFOLLOWNEEDS_PSY_ALL_CORE
No psychiatric contraindications to discharge

## 2021-12-03 NOTE — BH CONSULTATION LIAISON PROGRESS NOTE - NSBHCHARTREVIEWLAB_PSY_A_CORE FT
11-30    136  |  105  |  14  ----------------------------<  122<H>  3.7   |  25  |  1.04    Ca    8.6      30 Nov 2021 07:44    TPro  7.3  /  Alb  3.2<L>  /  TBili  1.1  /  DBili  x   /  AST  26  /  ALT  28  /  AlkPhos  60  11-30  
12-02    141  |  110<H>  |  16  ----------------------------<  112<H>  3.7   |  26  |  0.95    Ca    8.9      02 Dec 2021 10:21    TPro  7.2  /  Alb  3.2<L>  /  TBili  0.8  /  DBili  x   /  AST  42<H>  /  ALT  51  /  AlkPhos  60  12-02  
12-02    141  |  110<H>  |  16  ----------------------------<  112<H>  3.7   |  26  |  0.95    Ca    8.9      02 Dec 2021 10:21    TPro  7.2  /  Alb  3.2<L>  /  TBili  0.8  /  DBili  x   /  AST  42<H>  /  ALT  51  /  AlkPhos  60  12-02  
11-30    136  |  105  |  14  ----------------------------<  122<H>  3.7   |  25  |  1.04    Ca    8.6      30 Nov 2021 07:44    TPro  7.3  /  Alb  3.2<L>  /  TBili  1.1  /  DBili  x   /  AST  26  /  ALT  28  /  AlkPhos  60  11-30

## 2021-12-03 NOTE — BH CONSULTATION LIAISON PROGRESS NOTE - NSBHCONSULTRECOMMENDOTHER_PSY_A_CORE FT
11/29/21: Celexa 20mg PO qd (at max daily dose in this age group); Seroquel 50mg PO qd + add on 12.5mg PO q6hrs PRN for mild agitation; Namenda can cause weakness, fatigue, somnolence so should be taken at bedtime   - severe agitation Ativan 2mg IVP q6hrs PRN   11/30/21: wife provided med list which has Namenda 10mg PO bid, celexa 20mg PO qd and rivastigmine 4.5mg PO bid. Patient has not gotten the rivastigmine here and only half dose of the Namenda. Wife and daughter feel like these medications are not working anyway and would like them off / regimen simplified.   - trying Seroquel 100mg Po qhs (wife, daughter aware of risks; agreed to trial); if failing, depakote sprinkles will be tried   - simplifying regimen including weaning off cognitive enhancers due to lack of efficacy at this time / side effects as per family's request 
11/29/21: Celexa 20mg PO qd (at max daily dose in this age group); Seroquel 50mg PO qd + add on 12.5mg PO q6hrs PRN for mild agitation; Namenda can cause weakness, fatigue, somnolence so should be taken at bedtime   - severe agitation Ativan 2mg IVP q6hrs PRN   11/30/21: wife provided med list which has Namenda 10mg PO bid, celexa 20mg PO qd and rivastigmine 4.5mg PO bid. Patient has not gotten the rivastigmine here and only half dose of the Namenda. Wife and daughter feel like these medications are not working anyway and would like them off / regimen simplified.   - trying Seroquel 100mg Po qhs (wife, daughter aware of risks; agreed to trial); if failing, depakote sprinkles will be tried   - simplifying regimen including weaning off cognitive enhancers due to lack of efficacy at this time / side effects as per family's request   12/1/21: depakote sprinkles 250mg PO bid (given in food) started as per wife's request in preparation for discharge home; family declines OBDULIA or nursing home placement at this time. Wife also requested simplification of Pt's entire medication regimen as he has increasing difficulty taking tablets and would not be able to take numerous pills. Only essential agents to remain. Discussed with Dr Ayala.   12/2/21: took HS medications the night before but refused PO medications this AM even when served in food   12/3/21: trying lower dose of Ativan IVP at 1mg as a PRN to see if that could be lowest effective dose (though not likely) as wife reported that he gets "too sedated" which is the goal of the treatment and this was extensively discussed with her. 
11/29/21: Celexa 20mg PO qd (at max daily dose in this age group); Seroquel 50mg PO qd + add on 12.5mg PO q6hrs PRN for mild agitation; Namenda can cause weakness, fatigue, somnolence so should be taken at bedtime   - severe agitation Ativan 2mg IVP q6hrs PRN   11/30/21: wife provided med list which has Namenda 10mg PO bid, celexa 20mg PO qd and rivastigmine 4.5mg PO bid. Patient has not gotten the rivastigmine here and only half dose of the Namenda. Wife and daughter feel like these medications are not working anyway and would like them off / regimen simplified.   - trying Seroquel 100mg Po qhs (wife, daughter aware of risks; agreed to trial); if failing, depakote sprinkles will be tried   - simplifying regimen including weaning off cognitive enhancers due to lack of efficacy at this time / side effects as per family's request   12/1/21: depakote sprinkles 250mg PO bid (given in food) started as per wife's request in preparation for discharge home; family declines OBDULIA or nursing home placement at this time. Wife also requested simplification of Pt's entire medication regimen as he has increasing difficulty taking tablets and would not be able to take numerous pills. Only essential agents to remain. Discussed with Dr Ayala. 
11/29/21: Celexa 20mg PO qd (at max daily dose in this age group); Seroquel 50mg PO qd + add on 12.5mg PO q6hrs PRN for mild agitation; Namenda can cause weakness, fatigue, somnolence so should be taken at bedtime   - severe agitation Ativan 2mg IVP q6hrs PRN   11/30/21: wife provided med list which has Namenda 10mg PO bid, celexa 20mg PO qd and rivastigmine 4.5mg PO bid. Patient has not gotten the rivastigmine here and only half dose of the Namenda. Wife and daughter feel like these medications are not working anyway and would like them off / regimen simplified.   - trying Seroquel 100mg Po qhs (wife, daughter aware of risks; agreed to trial); if failing, depakote sprinkles will be tried   - simplifying regimen including weaning off cognitive enhancers due to lack of efficacy at this time / side effects as per family's request   12/1/21: depakote sprinkles 250mg PO bid (given in food) started as per wife's request in preparation for discharge home; family declines OBDULIA or nursing home placement at this time. Wife also requested simplification of Pt's entire medication regimen as he has increasing difficulty taking tablets and would not be able to take numerous pills. Only essential agents to remain. Discussed with Dr Ayala.   12/2/21: took HS medications the night before but refused PO medications this AM even when served in food

## 2021-12-03 NOTE — BH CONSULTATION LIAISON PROGRESS NOTE - MSE UNSTRUCTURED FT
semi-sedated; lying in bed with eyes closed; does not engage in meaningful conversation; new hep-lock in place with dressing 
semi-sedated; lying in bed with eyes closed; at times trying to roll over; does not engage in meaningful conversation 
semi-sedated; lying in camille chair outside of room with eyes closed; does not engage in meaningful conversation 
semi-sedated; lying in camille chair outside of room with eyes closed; does not engage in meaningful conversation

## 2021-12-03 NOTE — BH CONSULTATION LIAISON PROGRESS NOTE - NSBHCHARTREVIEWVS_PSY_A_CORE FT
Vital Signs Last 24 Hrs  T(C): 37 (03 Dec 2021 05:54), Max: 37.1 (02 Dec 2021 16:39)  T(F): 98.6 (03 Dec 2021 05:54), Max: 98.7 (02 Dec 2021 16:39)  HR: 63 (03 Dec 2021 05:54) (63 - 79)  BP: 158/74 (03 Dec 2021 05:54) (151/88 - 158/74)  BP(mean): --  RR: 18 (03 Dec 2021 05:54) (18 - 18)  SpO2: 97% (03 Dec 2021 05:54) (95% - 97%)
Vital Signs Last 24 Hrs  T(C): 37.2 (30 Nov 2021 12:28), Max: 37.2 (30 Nov 2021 12:28)  T(F): 99 (30 Nov 2021 12:28), Max: 99 (30 Nov 2021 12:28)  HR: 78 (30 Nov 2021 12:28) (78 - 96)  BP: 137/72 (30 Nov 2021 12:28) (120/68 - 141/69)  BP(mean): --  RR: 18 (30 Nov 2021 12:28) (18 - 19)  SpO2: 93% (30 Nov 2021 12:28) (93% - 96%)
Vital Signs Last 24 Hrs  T(C): 36.3 (01 Dec 2021 11:51), Max: 36.6 (30 Nov 2021 23:10)  T(F): 97.3 (01 Dec 2021 11:51), Max: 97.8 (30 Nov 2021 23:10)  HR: 73 (01 Dec 2021 11:51) (71 - 80)  BP: 148/83 (01 Dec 2021 11:51) (118/49 - 148/83)  BP(mean): --  RR: 18 (01 Dec 2021 11:51) (18 - 20)  SpO2: 91% (01 Dec 2021 11:51) (91% - 96%)
Vital Signs Last 24 Hrs  T(C): 36.6 (02 Dec 2021 05:38), Max: 36.6 (01 Dec 2021 23:34)  T(F): 97.9 (02 Dec 2021 05:38), Max: 97.9 (02 Dec 2021 05:38)  HR: 75 (02 Dec 2021 05:38) (75 - 90)  BP: 134/71 (02 Dec 2021 05:38) (129/79 - 146/84)  BP(mean): --  RR: 19 (02 Dec 2021 05:38) (19 - 19)  SpO2: 91% (02 Dec 2021 05:38) (91% - 94%)

## 2021-12-03 NOTE — BH CONSULTATION LIAISON PROGRESS NOTE - NSBHCONSULTFOLLOWAFTERCARE_PSY_A_CORE FT
As of now, Wife and daughter are opting to take Patient home as he does not do well away from family and gets combative and agitated if he does not see them; hence family does not think he would do well in an [facility] or a nursing home.  - intake phone # given to daughter for [facility] for med management; they also still do Telepsychiatry 
Initially, Wife and daughter are opting to take Patient home as he does not do well away from family and gets combative and agitated if he does not see them; hence family does not think he would do well in an [facility] or a nursing home. Thus intake phone # given to daughter for [facility] for med management; they also still do Telepsychiatry. Now, family hesitant to take Patient home; alternative DC plan explored 
 As of now, Wife and daughter are opting to take Patient home as he does not do well away from family and gets combative and agitated if he does not see them; hence family does not think he would do well in an [facility].  - intake phone # given to daughter for [facility] for med management; they also still do Telepsychiatry 
 As of now, Wife and daughter are opting to take Patient home as he does not do well away from family and gets combative and agitated if he does not see them; hence family does not think he would do well in an [facility] or a nursing home.  - intake phone # given to daughter for [facility] for med management; they also still do Telepsychiatry

## 2021-12-03 NOTE — BH CONSULTATION LIAISON PROGRESS NOTE - NSBHFUPINTERVALHXFT_PSY_A_CORE
Patient lying in bed; semi-awake, somnolent, not verbally engaging. Extensive discussion held with Pt's wife Natasha and daughter Jenna at bedside. Natasha has been sole caretaker of Patient since his diagnosis. Main issue for the past1 year has been sleep-wake cycle reversal including Pt being up all night, getting confused and disoriented including thinking he is on the job as a  and he is under threat by a perp. He gets agitated and at times physically violent during these episodes and does not recognize his wife. Natasha reports that she has had black and blue bruising before from Patient as he was combative with her as she tried to stop him from going out the door etc. His verbal ability has also diminished though still can say 1-2 words. His gait slowed and has become unsteady but Pt refuses to use a walker he has at home. Increasing resistance to taking pills by mouth. His medication is managed by a PMD at this time who no longer feels comfortable given worsening symptoms and told family to take patient to a Neurologist. As of now, patient does not have an outpatient neurologist or geriatric psychiatrist to follow him. LONG explanation of dementia, progressive nature, and so on discussed. Medications discussed including symptom management and the FDA Black Box Warnings with the use of antipsychotics in dementia / elderly patients. Both daughter and wife verbalized understanding and would like it to be used with patient with his sleep and impulsivity, aggression being targets of therapy. Agreed to try Seroquel at 100mg Po qhs tonight  and to simplify regimen / take off namenda and rivastigmine as they have been used for years and "not working at all." As of now, they are opting to tale Patient home as he does not do well away from family and gets combative and agitated if he does not see them; hence family does not think he would do well in an [facility]. 
Code [place] earlier - Patient was agiatted and aggressive during ADL assistance, confused, did not recognize anyone and grabbed his wife; STAT IVP Ativan for safety of others due to imminent physical harm was given for treatment of agitation with good effect. Refused PO meds last night so did not try the Seroquel 100mg. Wife again at bedside - upset that the hospice evaluator has not talked ot her in-person; emotional support given and hospice criteria again explained. Wife at this time wants Patient to be started / tried on the depakote sprinkles, even if Pt has sedation daytime, so she can better handle him at home.
Patient took one dose of the Depakote sprinkles and the Seroquel 100mg tab last night but refused PO medications this morning. Patient agitated last evening and early this morning and received a total of Ativan 2mg x 2 doses with good effect. Wife again at bedside - upset that the hospice evaluator has not talked ot her in-person; emotional support given and hospice criteria again explained. Wife continues to want Patient to be started / tried on the depakote sprinkles (and whatever agent) even if Pt has sedation daytime, so she can better handle him at home.
IV line re-inserted and working. Patient refused last night's Seroquel and Depakote; only willing to take PO PRN Seroquel and Celexa this morning in pudding when wife was at bedside. Continues to need full 24/7 ADL assistance, meal assists with limited PO intake with preference for "sweet" easy to eat foods like pudding, apple sauce, and only a few spoons full, consistent with "pleasure feeds" in advanced dementia. Resistance to oral medications also consistent with advanced dementia. Minimal verbal ability, not able to articulate needs.

## 2021-12-04 ENCOUNTER — TRANSCRIPTION ENCOUNTER (OUTPATIENT)
Age: 81
End: 2021-12-04

## 2021-12-04 VITALS
SYSTOLIC BLOOD PRESSURE: 139 MMHG | HEART RATE: 80 BPM | DIASTOLIC BLOOD PRESSURE: 98 MMHG | OXYGEN SATURATION: 98 % | RESPIRATION RATE: 18 BRPM | TEMPERATURE: 98 F

## 2021-12-04 LAB
CULTURE RESULTS: SIGNIFICANT CHANGE UP
CULTURE RESULTS: SIGNIFICANT CHANGE UP
SPECIMEN SOURCE: SIGNIFICANT CHANGE UP
SPECIMEN SOURCE: SIGNIFICANT CHANGE UP

## 2021-12-04 PROCEDURE — 99239 HOSP IP/OBS DSCHRG MGMT >30: CPT

## 2021-12-04 RX ORDER — QUETIAPINE FUMARATE 200 MG/1
1 TABLET, FILM COATED ORAL
Qty: 0 | Refills: 0 | DISCHARGE
Start: 2021-12-04

## 2021-12-04 RX ORDER — INFLUENZA VIRUS VACCINE 15; 15; 15; 15 UG/.5ML; UG/.5ML; UG/.5ML; UG/.5ML
0.7 SUSPENSION INTRAMUSCULAR ONCE
Refills: 0 | Status: DISCONTINUED | OUTPATIENT
Start: 2021-12-04 | End: 2021-12-04

## 2021-12-04 RX ORDER — CITALOPRAM 10 MG/1
1 TABLET, FILM COATED ORAL
Qty: 0 | Refills: 0 | DISCHARGE
Start: 2021-12-04

## 2021-12-04 RX ORDER — METOPROLOL TARTRATE 50 MG
1 TABLET ORAL
Qty: 0 | Refills: 0 | DISCHARGE
Start: 2021-12-04

## 2021-12-04 RX ORDER — DIVALPROEX SODIUM 500 MG/1
2 TABLET, DELAYED RELEASE ORAL
Qty: 0 | Refills: 0 | DISCHARGE
Start: 2021-12-04

## 2021-12-04 RX ADMIN — HEPARIN SODIUM 5000 UNIT(S): 5000 INJECTION INTRAVENOUS; SUBCUTANEOUS at 05:49

## 2021-12-04 RX ADMIN — Medication 2 MILLIGRAM(S): at 12:06

## 2021-12-04 RX ADMIN — DIVALPROEX SODIUM 250 MILLIGRAM(S): 500 TABLET, DELAYED RELEASE ORAL at 05:49

## 2021-12-04 RX ADMIN — Medication 50 MILLIGRAM(S): at 05:49

## 2021-12-04 RX ADMIN — Medication 1 MILLIGRAM(S): at 04:18

## 2021-12-04 NOTE — DISCHARGE NOTE NURSING/CASE MANAGEMENT/SOCIAL WORK - PATIENT PORTAL LINK FT
You can access the FollowMyHealth Patient Portal offered by [facility] by registering at the following website: [email or web address]. By joining [facility]’s FollowMyHealth portal, you will also be able to view your health information using other applications (apps) compatible with our system.

## 2021-12-04 NOTE — PROGRESS NOTE ADULT - SUBJECTIVE AND OBJECTIVE BOX
Patient is a 80y old  Male who presents with a chief complaint of AMS (30 Nov 2021 15:12)    INTERVAL HPI/OVERNIGHT EVENTS: no events     MEDICATIONS  (STANDING):  citalopram 20 milliGRAM(s) Oral daily  diVALproex Sprinkle 250 milliGRAM(s) Oral two times a day  heparin   Injectable 5000 Unit(s) SubCutaneous every 12 hours  metoprolol succinate ER 50 milliGRAM(s) Oral daily  QUEtiapine 100 milliGRAM(s) Oral at bedtime    MEDICATIONS  (PRN):  LORazepam   Injectable 2 milliGRAM(s) IV Push every 6 hours PRN severe agitation / combativeness  QUEtiapine 12.5 milliGRAM(s) Oral every 6 hours PRN mild agitation      No Known Allergies    Intolerances        REVIEW OF SYSTEMS: all negative with exception of above        Vital Signs Last 24 Hrs  T(C): 36.6 (04 Dec 2021 12:26), Max: 36.6 (04 Dec 2021 12:26)  T(F): 97.8 (04 Dec 2021 12:26), Max: 97.8 (04 Dec 2021 12:26)  HR: 80 (04 Dec 2021 12:26) (78 - 82)  BP: 139/98 (04 Dec 2021 12:26) (139/98 - 148/95)  BP(mean): --  RR: 18 (04 Dec 2021 12:26) (18 - 18)  SpO2: 98% (04 Dec 2021 12:26) (94% - 98%)    PHYSICAL EXAM:  General: patient in no acute distress, resting comfortably  Cardio: S1/S2 +ve, regular rate and rhythm, no M/G/R  Resp: clear to ausculation bilaterally, no rales or wheezes  GI: abdomen soft, nontender, non distended, no guarding, BS +ve x 4  Neuro: unresponsive, recently received sedation  Skin: No rashes or lesions    LABS:                          CAPILLARY BLOOD GLUCOSE          RADIOLOGY & ADDITIONAL TESTS:    Imaging Personally Reviewed:  [ ] YES  [ ] NO    Consultant(s) Notes Reviewed:  [x ] YES  [ ] NO    Care Discussed with Consultants/Other Providers [ ] YES  [ ] NO

## 2021-12-04 NOTE — PROGRESS NOTE ADULT - PROBLEM SELECTOR PLAN 2
Patient close to baseline as per wife.
- behavioral health c/s placed for delirium w/ advanced Alzheimer's dementia  - c/w seroquel  - F/u psych recs
- behavioral health c/s placed for delirium w/ advanced Alzheimer's dementia  - c/w seroquel  - F/u psych recs- seroquel and Ativan PRN added for agitation
puree diet  eats at times when family or staff feeds him
Patient close to baseline as per wife.
Patient close to baseline as per wife.
- behavioral health c/s placed for delirium w/ advanced Alzheimer's dementia  - c/w seroquel  - F/u psych recs- seroquel and Ativan PRN added for agitation. Will touch base with psych in regards to starting Depakote sprinkle given patient with limited PO intake.
puree diet  eats at times when family or staff feeds him

## 2021-12-04 NOTE — PROGRESS NOTE ADULT - PROBLEM SELECTOR PLAN 3
Lactate resolved, unknown source
Lactate resolved, unknown source
- behavioral health c/s placed for delirium w/ advanced Alzheimer's dementia  - c/w seroquel and newly added depakote sprinkles.   pt accepted to [facility]
- behavioral health c/s placed for delirium w/ advanced Alzheimer's dementia  - c/w seroquel and newly added depakote sprinkles.   - psych and palliative on board    12/3 refused medications overnight and had to get a prn. sw working on placement
- behavioral health c/s placed for delirium w/ advanced Alzheimer's dementia  - c/w seroquel and newly added depakote sprinkles  - psych and palliative on board
ambulates with assistance  unsteady on feet
Lactate resolved, unknown source
ambulates with assistance  unsteady on feet

## 2021-12-04 NOTE — PROGRESS NOTE ADULT - PROBLEM SELECTOR PROBLEM 2
Agitation due to dementia
Dysphagia
Metabolic encephalopathy
Metabolic encephalopathy
Agitation due to dementia
Dysphagia
Agitation due to dementia
Metabolic encephalopathy

## 2021-12-04 NOTE — PROGRESS NOTE ADULT - PROBLEM SELECTOR PROBLEM 1
Delirium, in, senile dementia
Metabolic encephalopathy
Other specified sepsis
Delirium, in, senile dementia
Other specified sepsis
Other specified sepsis

## 2021-12-04 NOTE — PROGRESS NOTE ADULT - PROBLEM SELECTOR PROBLEM 3
Lactic acidosis
Debility
Lactic acidosis
Lactic acidosis
Agitation due to dementia
Debility
Agitation due to dementia
Agitation due to dementia

## 2021-12-04 NOTE — PROGRESS NOTE ADULT - PROBLEM SELECTOR PROBLEM 5
Essential hypertension
Alzheimer's dementia
Essential hypertension

## 2021-12-04 NOTE — PROGRESS NOTE ADULT - PROBLEM SELECTOR PLAN 4
Lactate resolved, unknown source. possible due to dehydration
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 
continue memantine, Seroquel
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 
continue memantine, seroquel
continue memantine, seroquel

## 2021-12-04 NOTE — PROGRESS NOTE ADULT - PROVIDER SPECIALTY LIST ADULT
Palliative Care
Hospitalist
Palliative Care
Hospitalist
Hospitalist

## 2021-12-04 NOTE — PROGRESS NOTE ADULT - PROBLEM SELECTOR PROBLEM 8
Preventive measure
BPH without urinary obstruction
Preventive measure
Preventive measure
BPH without urinary obstruction
BPH without urinary obstruction

## 2021-12-04 NOTE — PROGRESS NOTE ADULT - PROBLEM SELECTOR PLAN 6
metoprolol
lipitor
metoprolol
voids freely into diapers  on flomax cannot crush
voids freely into diapers  on flomax cannot crush
metoprolol

## 2021-12-04 NOTE — PROGRESS NOTE ADULT - PROBLEM SELECTOR PLAN 1
POA  102 temp and wbc of 15k on admission   suspect 2/2 PNA which was seen on cta   s/p 5 days of antibiotics and   clinically improving with normal wbc and remains afebrile since admission  normal o2 sats now

## 2021-12-04 NOTE — PROGRESS NOTE ADULT - PROBLEM SELECTOR PROBLEM 4
Alzheimer's dementia
Lactic acidosis
Alzheimer's dementia
Lactic acidosis
Alzheimer's dementia
Lactic acidosis

## 2021-12-04 NOTE — PROGRESS NOTE ADULT - PROBLEM SELECTOR PROBLEM 6
Essential hypertension
BPH without urinary obstruction
Hyperlipidemia
BPH without urinary obstruction
Hyperlipidemia
Hyperlipidemia
Essential hypertension
Essential hypertension

## 2021-12-04 NOTE — PROGRESS NOTE ADULT - ASSESSMENT
Patient is an 80M with a PMH of advanced dementia, HTN, HLD, BPH  who presents to the ED for AMS.  Patient mostly nonverbal, currently unarousable due to recently receiving medication for sedation.  Patient unable to provide history.  Wife - Natasha - at the bedside.  Patient reportedly has had increased lethargy today, is not following his normal routine at home.  Patient typically able to walk short distances inside his home but now is unable to get up to use the bathroom.  Wife noted yestetrday evening that patient had subjective chills, shakes, and was diaphoretic.  Brought patient to the ED for evaluation.  Wife notes that patient has severe dementia with frequent violent outburts at night (patient was a .)  No other complaints.  Labs show leukocytosis and lactic acidosis.  Currently SpO2 94% on 4L via NC.  Will admit to med surg.      IMPROVE VTE Individual Risk Assessment          RISK                                                          Points  [  ] Previous VTE                                                3  [  ] Thrombophilia                                             2  [  ] Lower limb paralysis                                    2        (unable to hold up >15 seconds)    [  ] Current Cancer                                             2         (within 6 months)  [  ] Immobilization > 24 hrs                              1  [  ] ICU/CCU stay > 24 hours                            1  [  ] Age > 60                                                    1    IMPROVE VTE Score - 1
Patient is an 80M with a PMH of advanced dementia, HTN, HLD, BPH  who presents to the ED for AMS.  Patient mostly nonverbal, currently unarousable due to recently receiving medication for sedation.  Patient unable to provide history.  Wife - Natasha - at the bedside.  Patient reportedly has had increased lethargy today, is not following his normal routine at home.  Patient typically able to walk short distances inside his home but now is unable to get up to use the bathroom.  Wife noted yestetrday evening that patient had subjective chills, shakes, and was diaphoretic.  Brought patient to the ED for evaluation.  Wife notes that patient has severe dementia with frequent violent outburts at night (patient was a .)  No other complaints.  Labs show leukocytosis and lactic acidosis.  Currently SpO2 94% on 4L via NC.  Will admit to med surg.      IMPROVE VTE Individual Risk Assessment          RISK                                                          Points  [  ] Previous VTE                                                3  [  ] Thrombophilia                                             2  [  ] Lower limb paralysis                                    2        (unable to hold up >15 seconds)    [  ] Current Cancer                                             2         (within 6 months)  [  ] Immobilization > 24 hrs                              1  [  ] ICU/CCU stay > 24 hours                            1  [  ] Age > 60                                                    1    IMPROVE VTE Score - 1
 80M with a PMH of advanced dementia, HTN, HLD, BPH  admitted for AMS, concern for underlying infection. Palliative Care consulted for GOC, possible hospice. 
 80M with a PMH of advanced dementia, HTN, HLD, BPH  admitted for AMS, concern for underlying infection. Palliative Care consulted for GOC, possible hospice. 
Patient is an 80M with a PMH of advanced dementia, HTN, HLD, BPH  who presents to the ED for AMS.  Patient mostly nonverbal, currently unarousable due to recently receiving medication for sedation.  Patient unable to provide history.  Wife - Natasha - at the bedside.  Patient reportedly has had increased lethargy today, is not following his normal routine at home.  Patient typically able to walk short distances inside his home but now is unable to get up to use the bathroom.  Wife noted yestetrday evening that patient had subjective chills, shakes, and was diaphoretic.  Brought patient to the ED for evaluation.  Wife notes that patient has severe dementia with frequent violent outburts at night (patient was a .)  No other complaints.  Labs show leukocytosis and lactic acidosis.  Currently SpO2 94% on 4L via NC.  Will admit to med surg.      
Patient is an 80M with a PMH of advanced dementia, HTN, HLD, BPH  who presents to the ED for AMS.  Patient mostly nonverbal, currently unarousable due to recently receiving medication for sedation.  Patient unable to provide history.  Wife - Natasha - at the bedside.  Patient reportedly has had increased lethargy today, is not following his normal routine at home.  Patient typically able to walk short distances inside his home but now is unable to get up to use the bathroom.  Wife noted yestetrday evening that patient had subjective chills, shakes, and was diaphoretic.  Brought patient to the ED for evaluation.  Wife notes that patient has severe dementia with frequent violent outburts at night (patient was a .)  No other complaints.  Labs show leukocytosis and lactic acidosis.  Currently SpO2 94% on 4L via NC.  Will admit to med surg.      IMPROVE VTE Individual Risk Assessment          RISK                                                          Points  [  ] Previous VTE                                                3  [  ] Thrombophilia                                             2  [  ] Lower limb paralysis                                    2        (unable to hold up >15 seconds)    [  ] Current Cancer                                             2         (within 6 months)  [  ] Immobilization > 24 hrs                              1  [  ] ICU/CCU stay > 24 hours                            1  [  ] Age > 60                                                    1    IMPROVE VTE Score - 1

## 2021-12-04 NOTE — PROGRESS NOTE ADULT - PROBLEM SELECTOR PLAN 5
metoprolol
continue PO med regimen as able
continue memantine, Seroquel
metoprolol
continue memantine, Seroquel
continue memantine, Seroquel
continue PO med regimen as able
metoprolol

## 2021-12-04 NOTE — PROGRESS NOTE ADULT - PROBLEM SELECTOR PROBLEM 7
Encounter for palliative care
Encounter for palliative care
Hyperlipidemia
BPH without urinary obstruction
Hyperlipidemia
BPH without urinary obstruction
BPH without urinary obstruction
Hyperlipidemia

## 2021-12-04 NOTE — DISCHARGE NOTE NURSING/CASE MANAGEMENT/SOCIAL WORK - NSDCPEFALRISK_GEN_ALL_CORE
For information on Fall & Injury Prevention, visit: [email or web address] OR  [email or web address] OR  https://www.cdc.gov/steadi/patient.html

## 2021-12-06 ENCOUNTER — NON-APPOINTMENT (OUTPATIENT)
Age: 81
End: 2021-12-06

## 2021-12-09 DIAGNOSIS — E78.5 HYPERLIPIDEMIA, UNSPECIFIED: ICD-10-CM

## 2021-12-09 DIAGNOSIS — G30.9 ALZHEIMER'S DISEASE, UNSPECIFIED: ICD-10-CM

## 2021-12-09 DIAGNOSIS — R53.81 OTHER MALAISE: ICD-10-CM

## 2021-12-09 DIAGNOSIS — A41.9 SEPSIS, UNSPECIFIED ORGANISM: ICD-10-CM

## 2021-12-09 DIAGNOSIS — R13.10 DYSPHAGIA, UNSPECIFIED: ICD-10-CM

## 2021-12-09 DIAGNOSIS — J18.9 PNEUMONIA, UNSPECIFIED ORGANISM: ICD-10-CM

## 2021-12-09 DIAGNOSIS — F02.80 DEMENTIA IN OTHER DISEASES CLASSIFIED ELSEWHERE, UNSPECIFIED SEVERITY, WITHOUT BEHAVIORAL DISTURBANCE, PSYCHOTIC DISTURBANCE, MOOD DISTURBANCE, AND ANXIETY: ICD-10-CM

## 2021-12-09 DIAGNOSIS — R09.02 HYPOXEMIA: ICD-10-CM

## 2021-12-09 DIAGNOSIS — E86.0 DEHYDRATION: ICD-10-CM

## 2021-12-09 DIAGNOSIS — Z20.822 CONTACT WITH AND (SUSPECTED) EXPOSURE TO COVID-19: ICD-10-CM

## 2021-12-09 DIAGNOSIS — E44.1 MILD PROTEIN-CALORIE MALNUTRITION: ICD-10-CM

## 2021-12-09 DIAGNOSIS — R47.1 DYSARTHRIA AND ANARTHRIA: ICD-10-CM

## 2021-12-09 DIAGNOSIS — G93.41 METABOLIC ENCEPHALOPATHY: ICD-10-CM

## 2021-12-09 DIAGNOSIS — Z79.899 OTHER LONG TERM (CURRENT) DRUG THERAPY: ICD-10-CM

## 2021-12-09 DIAGNOSIS — I10 ESSENTIAL (PRIMARY) HYPERTENSION: ICD-10-CM

## 2021-12-09 DIAGNOSIS — Z91.14 PATIENT'S OTHER NONCOMPLIANCE WITH MEDICATION REGIMEN: ICD-10-CM

## 2021-12-09 DIAGNOSIS — Z66 DO NOT RESUSCITATE: ICD-10-CM

## 2021-12-09 DIAGNOSIS — E87.2 ACIDOSIS: ICD-10-CM

## 2021-12-09 DIAGNOSIS — N40.0 BENIGN PROSTATIC HYPERPLASIA WITHOUT LOWER URINARY TRACT SYMPTOMS: ICD-10-CM

## 2022-03-06 ENCOUNTER — APPOINTMENT (OUTPATIENT)
Dept: INTERNAL MEDICINE | Facility: CLINIC | Age: 82
End: 2022-03-06

## 2022-03-06 ENCOUNTER — NON-APPOINTMENT (OUTPATIENT)
Age: 82
End: 2022-03-06

## 2022-04-29 NOTE — CONSULT NOTE ADULT - PROBLEM SELECTOR RECOMMENDATION 3
[FreeTextEntry1] : hypertension\par The patient has a diagnosis of hypertension. Blood work was drawn in office and will be followed.  The diagnosis was discussed with patient and need for medication compliance and possible side affects and risks of noncompliance. Patient was told to adhere to a low salt diet and try to incorporate exercise daily.\par elevated on two occasions, increased ace to 10, now better controlled\par \par diabetes\par The diagnosis of diabetes is established with this patient. Previous blood work was reviewed prior to the visit and with the patient at time of visit.  Blood work values were explained as well.  Blood work was drawn in office and results will be reviewed and followed. The patient was counseled on using a low sugar and carbohydrate diet.  Patient was advised to eat small meals high in protein and to exercise regularly. Patient as advised to take all medications as prescribed, compliance with medications was reinforced.  Patient should follow up with yearly podiatry and opthalmology visits. Patient was advised to call office  or go to the ER immediately if experiences any nausea, lightheadedness or for any other issues.\par last a1c was 6.6, today 5.6, will remain on same medications, if next visit rts, will take off januvia\par \par cholesterol\par The diagnosis of high cholesterol is established and known to the patient and provider.  Previous blood work was evaluated at time of visit and reviewed with the patient. Blood work was drawn in office and results will be reviewed and followed. The patient was counseled on a low cholesterol diet and on medication compliance. Patient was advised to generally limit foods fried in oil, high in saturated fat, cheese, eggs and red meat. Patient was advised to continue on current medications and to followup in office for necessary blood work in three months.\par elevated trig, counseled, low carb\par \par mood disorder\par Discussed diagnosis of anxiety and depression with the patient and potential outcomes/side affects of treatment versus non treatment. Medications were assessed and described at length. Side affects and black box warning were discussed.  Patient was advised to continue will all medications prescribed and the need for compliance was discussed and emphasized. Patient was advised to not stop medications without discussing with a health care provider first. Patient was advised to continue psychotherapy or seek therapy if not currently attending. Patient was educated on addictive potential of controlled substances and was counseled to use only as needed and sparingly. Patient verbalized understanding of all the above.\par was stable on medications, however due to fatigue, patient took himself off abilify, then realized he needs it, became extremely anxious, "bouncing off the walls", nauseous\par however due to fatigue, can try 2 mg and reassess if improves fatigue\par 4/29/22 improved fatigue on 2 mg, will switch, stop the 5 mg\par \par should pain \par improved after surgery, uses naproxen as needed
walks with 2 person assist

## 2023-12-26 NOTE — ED ADULT NURSE NOTE - NS ED NURSE DC PT EDUCATION RESOURCES
Anal folds/rugae Within Defined Limits (Without tenderness, ecchymosis, abrasions, redness, swelling) Yes

## 2023-12-31 PROBLEM — Z23 NEED FOR VACCINATION WITH 13-POLYVALENT PNEUMOCOCCAL CONJUGATE VACCINE: Status: ACTIVE | Noted: 2019-06-02

## 2025-02-19 NOTE — PATIENT PROFILE ADULT - PHONE #
**For crisis resources, please see the information at the end of this document**   Patient Education    Thank you for coming to the Children's Mercy Hospital MENTAL HEALTH & ADDICTION Harvey [phone].     Financial Assistance [phone]   Medical Records [phone]       MENTAL HEALTH CRISIS RESOURCES:  For a emergency help, please call 911 or go to the nearest Emergency Department.     Emergency Walk-In Options:   [facility] Unit @ [facility] ([place]): [phone] - Specialized mental health emergency area designed to be calming  [facility] ([place]): [phone]  [facility] ([place]): [phone]  [facility]): [phone]    [facility] Crisis Information:   [place]: [phone]  [place]: [phone]  [place] ([facility]) - Adult: [phone]     Child: [phone]  [place] - Adult: [phone]     Child: [phone]  [place]:  [phone]  List of all [facility] resources:   https://[place].gov/dhs/people-we-serve/9-891-354-4736  Trans Lifeline: 5-842-901-3029 - Hotline for transgender people of all ages  [employer]: 4-881-458-0358 - Hotline for LGBT youth     For Non-Emergency Support:   Fast Tracker: Mental Health & Substance Use Disorder Resources -   https://www.[employer].org/         218.559.2200